# Patient Record
Sex: FEMALE | Employment: OTHER | URBAN - METROPOLITAN AREA
[De-identification: names, ages, dates, MRNs, and addresses within clinical notes are randomized per-mention and may not be internally consistent; named-entity substitution may affect disease eponyms.]

---

## 2018-03-27 PROCEDURE — 88175 CYTOPATH C/V AUTO FLUID REDO: CPT | Performed by: INTERNAL MEDICINE

## 2019-06-10 PROCEDURE — 88173 CYTOPATH EVAL FNA REPORT: CPT | Performed by: OTOLARYNGOLOGY

## 2021-03-18 ENCOUNTER — HOSPITAL ENCOUNTER (OUTPATIENT)
Age: 66
Discharge: HOME | End: 2021-03-18
Payer: MEDICARE

## 2021-03-18 ENCOUNTER — APPOINTMENT (OUTPATIENT)
Dept: GENERAL RADIOLOGY | Age: 66
End: 2021-03-18
Attending: NURSE PRACTITIONER
Payer: MEDICARE

## 2021-03-18 VITALS
OXYGEN SATURATION: 97 % | RESPIRATION RATE: 16 BRPM | DIASTOLIC BLOOD PRESSURE: 86 MMHG | SYSTOLIC BLOOD PRESSURE: 147 MMHG | TEMPERATURE: 99 F | HEART RATE: 93 BPM

## 2021-03-18 DIAGNOSIS — J18.9 COMMUNITY ACQUIRED PNEUMONIA OF RIGHT LOWER LOBE OF LUNG: ICD-10-CM

## 2021-03-18 DIAGNOSIS — R05.9 COUGH: Primary | ICD-10-CM

## 2021-03-18 PROCEDURE — 71046 X-RAY EXAM CHEST 2 VIEWS: CPT | Performed by: NURSE PRACTITIONER

## 2021-03-18 PROCEDURE — 99202 OFFICE O/P NEW SF 15 MIN: CPT | Performed by: NURSE PRACTITIONER

## 2021-03-18 RX ORDER — CEFDINIR 300 MG/1
300 CAPSULE ORAL 2 TIMES DAILY
Qty: 20 CAPSULE | Refills: 0 | Status: SHIPPED | OUTPATIENT
Start: 2021-03-18 | End: 2021-03-28

## 2021-03-18 RX ORDER — AZITHROMYCIN 250 MG/1
TABLET, FILM COATED ORAL
Qty: 1 PACKAGE | Refills: 0 | Status: SHIPPED | OUTPATIENT
Start: 2021-03-18 | End: 2021-03-23

## 2021-03-18 NOTE — ED PROVIDER NOTES
Patient Seen in: Immediate 234       History   Patient presents with:  Cough/URI    Stated Complaint: sore throat    HPI/Subjective:   64-year-old female presents to the IC with complaints of a cough for last 3 weeks. States slightly productive. Vitals [03/18/21 1057]   /86   Pulse 93   Resp 16   Temp 98.7 °F (37.1 °C)   Temp src Temporal   SpO2 97 %   O2 Device None (Room air)       Current:/86   Pulse 93   Temp 98.7 °F (37.1 °C) (Temporal)   Resp 16   LMP 05/07/2004   SpO2 97% that they go back to the old primary for further evaluation. I discussed case with Dr. Mitchell Keep she will gladly accept the patient at this time. Patient is to make a follow-up appointment for further evaluation of the x-ray findings.        The findings on

## 2021-03-19 ENCOUNTER — OFFICE VISIT (OUTPATIENT)
Dept: FAMILY MEDICINE CLINIC | Facility: CLINIC | Age: 66
End: 2021-03-19
Payer: MEDICARE

## 2021-03-19 VITALS
WEIGHT: 202 LBS | BODY MASS INDEX: 38.14 KG/M2 | HEART RATE: 84 BPM | TEMPERATURE: 98 F | RESPIRATION RATE: 17 BRPM | SYSTOLIC BLOOD PRESSURE: 114 MMHG | OXYGEN SATURATION: 98 % | DIASTOLIC BLOOD PRESSURE: 82 MMHG | HEIGHT: 61 IN

## 2021-03-19 DIAGNOSIS — R05.9 COUGH: ICD-10-CM

## 2021-03-19 DIAGNOSIS — R93.89 ABNORMAL CHEST X-RAY: Primary | ICD-10-CM

## 2021-03-19 PROBLEM — E04.1 THYROID NODULE: Status: ACTIVE | Noted: 2021-03-19

## 2021-03-19 PROBLEM — E78.5 HYPERLIPIDEMIA: Status: ACTIVE | Noted: 2021-03-19

## 2021-03-19 PROCEDURE — 99204 OFFICE O/P NEW MOD 45 MIN: CPT | Performed by: FAMILY MEDICINE

## 2021-03-19 RX ORDER — ASPIRIN 81 MG/1
81 TABLET ORAL DAILY
COMMUNITY

## 2021-03-19 RX ORDER — BENZONATATE 100 MG/1
CAPSULE ORAL
COMMUNITY
Start: 2020-11-21 | End: 2021-08-26 | Stop reason: ALTCHOICE

## 2021-03-19 RX ORDER — TRIAMTERENE AND HYDROCHLOROTHIAZIDE 37.5; 25 MG/1; MG/1
CAPSULE ORAL
COMMUNITY
Start: 2021-02-13 | End: 2021-03-19

## 2021-03-19 NOTE — PROGRESS NOTES
[facility] Family Medicine Office Note  Chief Complaint:   Patient presents with:  Establish Care: per pt      HPI:   This is a 74year old female coming in for  Right after she received her second COVID shot on 2/20/2021 developed a dry cough, s Allergies:  No Known Allergies  Current Meds:  Current Outpatient Medications   Medication Sig Dispense Refill   • aspirin 81 MG Oral Tab EC Take 81 mg by mouth daily.      • benzonatate 100 MG Oral Cap Take one cap three times daily for cough     • cef OP - wnl, moist, Nares normal  NECK: FROM, supple  LUNGS: CTAB, no RRW, no evidence of use of any accessory muscle use and normal respiratory effort noted at this time.    CV: RRR  ABD: not distended  NEURO: Alert and oriented to person place and time  GAIT

## 2021-03-22 ENCOUNTER — HOSPITAL ENCOUNTER (OUTPATIENT)
Dept: CT IMAGING | Age: 66
Discharge: HOME | End: 2021-03-22
Attending: FAMILY MEDICINE
Payer: MEDICARE

## 2021-03-22 DIAGNOSIS — R05.9 COUGH: ICD-10-CM

## 2021-03-22 DIAGNOSIS — R93.89 ABNORMAL CHEST X-RAY: ICD-10-CM

## 2021-03-22 LAB — CREAT BLD-MCNC: 0.9 MG/DL

## 2021-03-22 PROCEDURE — 82565 ASSAY OF CREATININE: CPT

## 2021-03-22 PROCEDURE — 71260 CT THORAX DX C+: CPT | Performed by: FAMILY MEDICINE

## 2021-03-24 ENCOUNTER — TELEPHONE (OUTPATIENT)
Dept: FAMILY MEDICINE CLINIC | Facility: CLINIC | Age: 66
End: 2021-03-24

## 2021-03-24 DIAGNOSIS — R91.8 LUNG MASS: Primary | ICD-10-CM

## 2021-04-01 ENCOUNTER — HOSPITAL ENCOUNTER (OUTPATIENT)
Dept: NUCLEAR MEDICINE | Facility: HOSPITAL | Age: 66
Discharge: HOME | End: 2021-04-01
Attending: FAMILY MEDICINE
Payer: MEDICARE

## 2021-04-01 DIAGNOSIS — R91.8 LUNG MASS: ICD-10-CM

## 2021-04-01 PROCEDURE — 78815 PET IMAGE W/CT SKULL-THIGH: CPT | Performed by: FAMILY MEDICINE

## 2021-04-01 PROCEDURE — 82962 GLUCOSE BLOOD TEST: CPT

## 2021-04-02 ENCOUNTER — TELEPHONE (OUTPATIENT)
Dept: FAMILY MEDICINE CLINIC | Facility: CLINIC | Age: 66
End: 2021-04-02

## 2021-04-02 NOTE — TELEPHONE ENCOUNTER
Patient is returning a call from Dr Geraldo Marrero from yesterday.  Patient is going into the [facility] right now and does not want to miss the Drs call, so have Dr Geraldo Marrero call her at/around 11:00 am.

## 2021-04-02 NOTE — TELEPHONE ENCOUNTER
----- Message from Shekhar Aguilar MD sent at 4/2/2021 10:22 AM CDT -----  Called patient and informed her of these results. Follow up recommended in 2-3 weeks. If cough gets worse to come in sooner.  Meanwhile advised to keep appointment to see

## 2021-04-08 ENCOUNTER — TELEPHONE (OUTPATIENT)
Dept: FAMILY MEDICINE CLINIC | Facility: CLINIC | Age: 66
End: 2021-04-08

## 2021-04-08 NOTE — TELEPHONE ENCOUNTER
Angie Allred from [address] [place] [address] called, Pt states she is suppose to get another CT of chest. No order in system.   Please place order and call Angie Allred at [phone]

## 2021-04-08 NOTE — TELEPHONE ENCOUNTER
Yes we should order a CT Chest with contrast on July 1, 2021 to document resolution of this mass / pneumonia. Please place reminder in patient chart.

## 2021-04-08 NOTE — TELEPHONE ENCOUNTER
Recall placed for repeat CT chest    Notified central scheduling - Huongkaren Sandifer at [phone]  CT chest is not due until July- will wait to put in order closer to then. Verbalized understanding.

## 2021-04-08 NOTE — TELEPHONE ENCOUNTER
Pt had CT chest 3/22/2021 (INDICATIONS:  R05 Cough R93.89 Abnormal chest x-ray)    Had PET/CT STANDARD BODY SCAN (ONCOLOGY) on 4/1/2021 (INDICATIONS:  R91.8 Lung mass)    Impression stated \"Consider CT imaging in 3 months to document resolution\"    Next

## 2021-05-14 ENCOUNTER — TELEPHONE (OUTPATIENT)
Dept: FAMILY MEDICINE CLINIC | Facility: CLINIC | Age: 66
End: 2021-05-14

## 2021-05-14 NOTE — TELEPHONE ENCOUNTER
Pt called, needs another note saying that pt needs to stay on unemployment. Please call pt at [phone]. Pt states the doctor who wrote the first letter has since retired. Dr. Uday Fagan Postin.

## 2021-05-17 NOTE — TELEPHONE ENCOUNTER
I have seen her only once in the office for a \"concerned\" visit regarding her lungs, mass noted in her lungs - infectious vs. Mass, and per her improvement it was likely infectious origin, she will need repeat imaging in the next 2 weeks.  I will need to

## 2021-05-17 NOTE — TELEPHONE ENCOUNTER
Left message to voicemail, per verbal release form consent, confirmed with identifying message. Patient advised to call office back [phone] to discuss 's note below.       Medicare Wellness Visit DUE  08/28/2021 08/28/2020, 08/28/2020

## 2021-05-20 NOTE — TELEPHONE ENCOUNTER
Left message to voicemail (per verbal release form consent, confirmed with identifying message.) Patient advised to call office back [phone] to make follow-up appt w/ Dr. Luz Alvarez to discuss note pt is requesting.

## 2021-06-18 DIAGNOSIS — R91.8 ABNORMAL CT SCAN, LUNG: Primary | ICD-10-CM

## 2021-07-15 ENCOUNTER — HOSPITAL ENCOUNTER (OUTPATIENT)
Dept: CT IMAGING | Age: 66
Discharge: HOME | End: 2021-07-15
Attending: FAMILY MEDICINE
Payer: MEDICARE

## 2021-07-15 DIAGNOSIS — R91.8 ABNORMAL CT SCAN, LUNG: ICD-10-CM

## 2021-07-15 LAB — CREAT BLD-MCNC: 0.9 MG/DL

## 2021-07-15 PROCEDURE — 82565 ASSAY OF CREATININE: CPT

## 2021-07-15 PROCEDURE — 71260 CT THORAX DX C+: CPT | Performed by: FAMILY MEDICINE

## 2021-07-19 ENCOUNTER — TELEPHONE (OUTPATIENT)
Dept: FAMILY MEDICINE CLINIC | Facility: CLINIC | Age: 66
End: 2021-07-19

## 2021-07-19 NOTE — TELEPHONE ENCOUNTER
----- Message from Shekhar Donnelly MD sent at 7/19/2021 10:18 AM CDT -----  Called patient to discuss results, \"lung mass\" \has now resolved. Pneumonia resolved.  Some incidental findings of a small left adrenal nodule, and  colonic diverticulo

## 2021-07-20 ENCOUNTER — PATIENT MESSAGE (OUTPATIENT)
Dept: FAMILY MEDICINE CLINIC | Facility: CLINIC | Age: 66
End: 2021-07-20

## 2021-07-20 RX ORDER — ROSUVASTATIN CALCIUM 5 MG/1
TABLET, COATED ORAL
Qty: 30 TABLET | Refills: 0 | Status: SHIPPED | OUTPATIENT
Start: 2021-07-20 | End: 2021-08-30

## 2021-07-20 RX ORDER — TRIAMTERENE AND HYDROCHLOROTHIAZIDE 37.5; 25 MG/1; MG/1
1 TABLET ORAL DAILY PRN
Qty: 30 TABLET | Refills: 0 | Status: SHIPPED | OUTPATIENT
Start: 2021-07-20 | End: 2021-08-30

## 2021-07-20 RX ORDER — TRIAMTERENE AND HYDROCHLOROTHIAZIDE 37.5; 25 MG/1; MG/1
1 TABLET ORAL DAILY PRN
Qty: 30 TABLET | Refills: 0 | Status: SHIPPED | OUTPATIENT
Start: 2021-07-20 | End: 2021-07-20

## 2021-07-20 NOTE — TELEPHONE ENCOUNTER
PT CALLED AND ADV THAT SHE HAS SET UP MEDICARE WELLNESS EXAM FOR 8/26/21    PT DOES NEED REFILL OF     Triamterene-HCTZ 37.5-25 MG Oral Tab    AND    Rosuvastatin Calcium 5 MG Oral Tab    PT ADV NEED TO SEND TO Jazmin Perez.     TRIAMTERENE WAS SENT

## 2021-07-20 NOTE — TELEPHONE ENCOUNTER
From: Raj Lincoln  To: Shekhar Elmore MD  Sent: 7/20/2021 1:11 PM CDT  Subject: Prescription Question    can i get refills     Show detailsHide details  Prescription Details  [facility]  Approved byTrice Lagos

## 2021-07-20 NOTE — TELEPHONE ENCOUNTER
LOV: 3/19/21   Last Refill:   1/20/21 #30 0 RF  Different provider    Sorry for the confusion- I didn't even see the medication in the note at first.    Medications have been pended for review    BP Readings from Last 2 Encounters:  03/19/21 : 114/82  03/1

## 2021-08-26 ENCOUNTER — OFFICE VISIT (OUTPATIENT)
Dept: FAMILY MEDICINE CLINIC | Facility: CLINIC | Age: 66
End: 2021-08-26
Payer: MEDICARE

## 2021-08-26 VITALS
DIASTOLIC BLOOD PRESSURE: 72 MMHG | OXYGEN SATURATION: 97 % | HEART RATE: 68 BPM | RESPIRATION RATE: 16 BRPM | HEIGHT: 62.25 IN | BODY MASS INDEX: 39.63 KG/M2 | SYSTOLIC BLOOD PRESSURE: 102 MMHG | TEMPERATURE: 98 F | WEIGHT: 218.13 LBS

## 2021-08-26 DIAGNOSIS — Z12.31 VISIT FOR SCREENING MAMMOGRAM: ICD-10-CM

## 2021-08-26 DIAGNOSIS — R60.0 PEDAL EDEMA: ICD-10-CM

## 2021-08-26 DIAGNOSIS — R06.00 DYSPNEA ON EXERTION: ICD-10-CM

## 2021-08-26 DIAGNOSIS — R63.5 WEIGHT GAIN: ICD-10-CM

## 2021-08-26 DIAGNOSIS — Z00.00 ENCOUNTER FOR ANNUAL HEALTH EXAMINATION: ICD-10-CM

## 2021-08-26 DIAGNOSIS — Z23 NEED FOR VACCINATION: ICD-10-CM

## 2021-08-26 DIAGNOSIS — E04.1 THYROID NODULE: ICD-10-CM

## 2021-08-26 DIAGNOSIS — Z12.11 ENCOUNTER FOR SCREENING COLONOSCOPY: ICD-10-CM

## 2021-08-26 DIAGNOSIS — Z13.0 SCREENING FOR DEFICIENCY ANEMIA: Primary | ICD-10-CM

## 2021-08-26 DIAGNOSIS — E78.5 HYPERLIPIDEMIA, UNSPECIFIED HYPERLIPIDEMIA TYPE: ICD-10-CM

## 2021-08-26 DIAGNOSIS — E27.8 ADRENAL INCIDENTALOMA (HCC): ICD-10-CM

## 2021-08-26 DIAGNOSIS — R61 EXCESSIVE SWEATING: ICD-10-CM

## 2021-08-26 PROBLEM — K57.90 DIVERTICULOSIS: Status: ACTIVE | Noted: 2021-08-26

## 2021-08-26 PROBLEM — Z87.01 HISTORY OF PNEUMONIA: Status: ACTIVE | Noted: 2021-08-26

## 2021-08-26 PROBLEM — E27.9 ADRENAL NODULE (HCC): Status: RESOLVED | Noted: 2021-08-26 | Resolved: 2021-08-26

## 2021-08-26 PROBLEM — H91.93 BILATERAL HEARING LOSS: Status: ACTIVE | Noted: 2021-08-26

## 2021-08-26 PROBLEM — E27.9 ADRENAL NODULE (HCC): Status: ACTIVE | Noted: 2021-08-26

## 2021-08-26 LAB
ALBUMIN SERPL-MCNC: 3.9 G/DL (ref 3.4–5)
ALBUMIN/GLOB SERPL: 1.2 {RATIO} (ref 1–2)
ALP LIVER SERPL-CCNC: 94 U/L
ALT SERPL-CCNC: 39 U/L
ANION GAP SERPL CALC-SCNC: 2 MMOL/L (ref 0–18)
AST SERPL-CCNC: 25 U/L (ref 15–37)
BASOPHILS # BLD AUTO: 0.03 X10(3) UL (ref 0–0.2)
BASOPHILS NFR BLD AUTO: 0.6 %
BILIRUB SERPL-MCNC: 0.4 MG/DL (ref 0.1–2)
BUN BLD-MCNC: 16 MG/DL (ref 7–18)
CALCIUM BLD-MCNC: 9.2 MG/DL (ref 8.5–10.1)
CHLORIDE SERPL-SCNC: 104 MMOL/L (ref 98–112)
CHOLEST SMN-MCNC: 185 MG/DL (ref ?–200)
CO2 SERPL-SCNC: 31 MMOL/L (ref 21–32)
CREAT BLD-MCNC: 0.74 MG/DL
EOSINOPHIL # BLD AUTO: 0.12 X10(3) UL (ref 0–0.7)
EOSINOPHIL NFR BLD AUTO: 2.5 %
ERYTHROCYTE [DISTWIDTH] IN BLOOD BY AUTOMATED COUNT: 13.1 %
EST. AVERAGE GLUCOSE BLD GHB EST-MCNC: 123 MG/DL (ref 68–126)
GLOBULIN PLAS-MCNC: 3.3 G/DL (ref 2.8–4.4)
GLUCOSE BLD-MCNC: 91 MG/DL (ref 70–99)
HBA1C MFR BLD HPLC: 5.9 % (ref ?–5.7)
HCT VFR BLD AUTO: 46.7 %
HDLC SERPL-MCNC: 67 MG/DL (ref 40–59)
HGB BLD-MCNC: 15.2 G/DL
IMM GRANULOCYTES # BLD AUTO: 0.01 X10(3) UL (ref 0–1)
IMM GRANULOCYTES NFR BLD: 0.2 %
LDLC SERPL CALC-MCNC: 97 MG/DL (ref ?–100)
LYMPHOCYTES # BLD AUTO: 1.35 X10(3) UL (ref 1–4)
LYMPHOCYTES NFR BLD AUTO: 27.7 %
M PROTEIN MFR SERPL ELPH: 7.2 G/DL (ref 6.4–8.2)
MCH RBC QN AUTO: 29.3 PG (ref 26–34)
MCHC RBC AUTO-ENTMCNC: 32.5 G/DL (ref 31–37)
MCV RBC AUTO: 90 FL
MONOCYTES # BLD AUTO: 0.49 X10(3) UL (ref 0.1–1)
MONOCYTES NFR BLD AUTO: 10 %
NEUTROPHILS # BLD AUTO: 2.88 X10 (3) UL (ref 1.5–7.7)
NEUTROPHILS # BLD AUTO: 2.88 X10(3) UL (ref 1.5–7.7)
NEUTROPHILS NFR BLD AUTO: 59 %
NONHDLC SERPL-MCNC: 118 MG/DL (ref ?–130)
OSMOLALITY SERPL CALC.SUM OF ELEC: 285 MOSM/KG (ref 275–295)
PATIENT FASTING Y/N/NP: YES
PATIENT FASTING Y/N/NP: YES
PLATELET # BLD AUTO: 254 10(3)UL (ref 150–450)
POTASSIUM SERPL-SCNC: 4.5 MMOL/L (ref 3.5–5.1)
RBC # BLD AUTO: 5.19 X10(6)UL
SODIUM SERPL-SCNC: 137 MMOL/L (ref 136–145)
TRIGL SERPL-MCNC: 121 MG/DL (ref 30–149)
TSI SER-ACNC: 1.25 MIU/ML (ref 0.36–3.74)
VLDLC SERPL CALC-MCNC: 20 MG/DL (ref 0–30)
WBC # BLD AUTO: 4.9 X10(3) UL (ref 4–11)

## 2021-08-26 PROCEDURE — 85025 COMPLETE CBC W/AUTO DIFF WBC: CPT | Performed by: FAMILY MEDICINE

## 2021-08-26 PROCEDURE — 83036 HEMOGLOBIN GLYCOSYLATED A1C: CPT | Performed by: FAMILY MEDICINE

## 2021-08-26 PROCEDURE — 80053 COMPREHEN METABOLIC PANEL: CPT | Performed by: FAMILY MEDICINE

## 2021-08-26 PROCEDURE — G0439 PPPS, SUBSEQ VISIT: HCPCS | Performed by: FAMILY MEDICINE

## 2021-08-26 PROCEDURE — 84443 ASSAY THYROID STIM HORMONE: CPT | Performed by: FAMILY MEDICINE

## 2021-08-26 PROCEDURE — 80061 LIPID PANEL: CPT | Performed by: FAMILY MEDICINE

## 2021-08-26 NOTE — PATIENT INSTRUCTIONS
Routine health maintenance     Mammogram ordered - schedule this ASAP   Colonoscopy ordered - referral placed to see Dr Ariana Adhikari   All fasting labs ordered today     Adrenal incidentaloma (noted on the CT chest)     Endocrinology - Dr Aaron Fernandez referral makayla Aneurysm (AAA) Covered once in a lifetime for one of the following risk factors   • Men who are 73-68 years old and have ever smoked   • Anyone with a family history -     Colorectal Cancer Screening  Covered for ages 52-80; only need ONE of the following: covered by Medicare Part B; may be covered with your pharmacy  prescription benefits 11/04/2015  No recommendations at this time        Annual Monitoring of Persistent Medications (ACE/ARB, digoxin diuretics, anticonvulsants)    Potassium Annually Lab Resu

## 2021-08-26 NOTE — PROGRESS NOTES
HPI:   Amaury Martin is a 74year old female who presents for a Medicare Subsequent Annual Wellness visit (Pt already had Initial Annual Wellness).     Complaints of weight gain     Wt Readings from Last 6 Encounters:  08/26/21 : 218 lb 2 oz (98.9 k risk of alcohol abuse.         Patient Care Team: Patient Care Team:  Shekhar Parkinson MD as PCP - General (Family Medicine)    Patient Active Problem List:     Hyperlipidemia     Thyroid nodule     History of pneumonia     Bilateral hearing loss not use drugs.      REVIEW OF SYSTEMS:      Negative except as mentioned in the HPI    EXAM:   /72   Pulse 68   Temp 98 °F (36.7 °C) (Temporal)   Resp 16   Ht 5' 2.25\" (1.581 m)   Wt 218 lb 2 oz (98.9 kg)   LMP 05/07/2004   SpO2 97%   BMI 39.58 kg/m² anemia  -     CBC WITH DIFFERENTIAL WITH PLATELET; Future    Encounter for annual health examination    Visit for screening mammogram  -     [facility] MARIELA 2D+3D SCREENING BILAT (CPT=77067/32101);  Future     Need for vaccination  -     PNEUMOCOCCAL IMM ([facility] referral placed for further work up   Excessive unexpected sweating - noting that she has episodes of this even when the temp is very cold     Thyroid nodule     Followed by Dr Marina Wan, ENT - most recent Thyroid US was in 5/2021 and TR 4 and TR 5 (rep Date    CHOLEST 185 08/26/2021    HDL 67 (H) 08/26/2021    LDL 97 08/26/2021    TRIG 121 08/26/2021         Electrocardiogram (EKG)   Covered if needed at Welcome to Medicare, and non-screening if indicated for medical reasons 04/02/2019      Ultrasound Sc Tetanus Toxoid Not covered by Medicare Part B unless medically necessary (cut with metal); may be covered with your pharmacy prescription benefits -    Tetanus, Diptheria and Pertusis TD and TDaP Not covered by Medicare Part B -  No recommendations at t

## 2021-08-30 RX ORDER — ROSUVASTATIN CALCIUM 5 MG/1
TABLET, COATED ORAL
Qty: 30 TABLET | Refills: 0 | Status: SHIPPED | OUTPATIENT
Start: 2021-08-30 | End: 2021-10-04

## 2021-08-30 RX ORDER — TRIAMTERENE AND HYDROCHLOROTHIAZIDE 37.5; 25 MG/1; MG/1
1 TABLET ORAL DAILY PRN
Qty: 30 TABLET | Refills: 0 | Status: SHIPPED | OUTPATIENT
Start: 2021-08-30 | End: 2021-10-04

## 2021-08-30 NOTE — TELEPHONE ENCOUNTER
Hypertension Medications Protocol [id number]/30/2021 06:47 AM   CMP or BMP in past 12 months Protocol Details    Last serum creatinine< 2.0     Appointment in past 6 or next 3 months      Triamterene-HCTZ 37.5-25 MG Oral Tab  Last refill- 7/20/21 #30 with 0

## 2021-09-08 ENCOUNTER — OFFICE VISIT (OUTPATIENT)
Dept: SURGERY | Facility: CLINIC | Age: 66
End: 2021-09-08
Payer: MEDICARE

## 2021-09-08 VITALS
BODY MASS INDEX: 40.12 KG/M2 | WEIGHT: 218 LBS | SYSTOLIC BLOOD PRESSURE: 105 MMHG | HEART RATE: 72 BPM | TEMPERATURE: 97 F | HEIGHT: 62 IN | DIASTOLIC BLOOD PRESSURE: 75 MMHG

## 2021-09-08 DIAGNOSIS — K63.5 POLYP OF COLON, UNSPECIFIED PART OF COLON, UNSPECIFIED TYPE: Primary | ICD-10-CM

## 2021-09-08 PROCEDURE — 99204 OFFICE O/P NEW MOD 45 MIN: CPT | Performed by: SURGERY

## 2021-09-08 RX ORDER — POLYETHYLENE GLYCOL 3350, SODIUM CHLORIDE, SODIUM BICARBONATE, POTASSIUM CHLORIDE 420; 11.2; 5.72; 1.48 G/4L; G/4L; G/4L; G/4L
POWDER, FOR SOLUTION ORAL
Qty: 1 EACH | Refills: 0 | Status: SHIPPED | OUTPATIENT
Start: 2021-09-08 | End: 2021-11-29 | Stop reason: ALTCHOICE

## 2021-09-08 NOTE — H&P
New Patient Visit Note       Active Problems      No diagnosis found.     Chief Complaint   Patient presents with:  Colonoscopy: NP cscope consult- PT states last cscope was 10 years ago denies rectal pain or bleeding states has normal Bms denies family hx Relation Age of Onset   • Cancer Father         prostate ca   • Hypertension Mother    • Ear Problems Mother    • Other (Other) Brother         ms     Social History    Tobacco Use      Smoking status: Never Smoker      Smokeless tobacco: Never Used    Alc membranes. No intra oral lesions. Trachea midline. Cardiovascular. Regular rate and rhythm. 2+ Pulses bilateral upper and lower extremities. No peripheral edema. Respiratory. Normal chest wall excursion. Breathing non labored. No wheezes.   Marylen Revels

## 2021-09-21 ENCOUNTER — HOSPITAL ENCOUNTER (OUTPATIENT)
Dept: CV DIAGNOSTICS | Facility: HOSPITAL | Age: 66
Discharge: HOME | End: 2021-09-21
Attending: FAMILY MEDICINE
Payer: MEDICARE

## 2021-09-21 DIAGNOSIS — R60.0 PEDAL EDEMA: ICD-10-CM

## 2021-09-21 PROCEDURE — 93306 TTE W/DOPPLER COMPLETE: CPT | Performed by: FAMILY MEDICINE

## 2021-09-23 ENCOUNTER — PATIENT MESSAGE (OUTPATIENT)
Dept: FAMILY MEDICINE CLINIC | Facility: CLINIC | Age: 66
End: 2021-09-23

## 2021-09-23 ENCOUNTER — TELEPHONE (OUTPATIENT)
Dept: FAMILY MEDICINE CLINIC | Facility: CLINIC | Age: 66
End: 2021-09-23

## 2021-09-23 NOTE — TELEPHONE ENCOUNTER
----- Message from Shekhar Hurley MD sent at 9/22/2021  8:38 PM CDT -----  [facility] sent   [facility] shows no concerns at this time. Leg swelling is because of long standing hours.  Keep legs elevated as much as you can. ~ MM

## 2021-09-23 NOTE — TELEPHONE ENCOUNTER
From: Wiley Penaloza  Sent: 9/23/2021 4:25 PM CDT  To: [facility] Clinical Staff  Subject: Test Results    i was wondering if my legs are swollen from my veins? i have been retired for 4 months now   do stand alit anymore

## 2021-09-23 NOTE — TELEPHONE ENCOUNTER
[facility] sent to pt regarding doctor's note below  Routing to nurse pool for follow-up message read by pt

## 2021-09-24 NOTE — TELEPHONE ENCOUNTER
Left message to voicemail (per verbal release form consent, confirmed with identifying message.) Patient advised to call office back [phone] - need to clarify [facility].

## 2021-09-28 RX ORDER — POLYETHYLENE GLYCOL 3350, SODIUM CHLORIDE, SODIUM BICARBONATE, POTASSIUM CHLORIDE 420; 11.2; 5.72; 1.48 G/4L; G/4L; G/4L; G/4L
POWDER, FOR SOLUTION ORAL
Qty: 1 EACH | Refills: 0 | OUTPATIENT
Start: 2021-09-28

## 2021-10-05 RX ORDER — TRIAMTERENE AND HYDROCHLOROTHIAZIDE 37.5; 25 MG/1; MG/1
1 TABLET ORAL DAILY PRN
Qty: 30 TABLET | Refills: 0 | Status: SHIPPED | OUTPATIENT
Start: 2021-10-05 | End: 2021-11-09

## 2021-10-05 RX ORDER — ROSUVASTATIN CALCIUM 5 MG/1
TABLET, COATED ORAL
Qty: 30 TABLET | Refills: 0 | Status: SHIPPED | OUTPATIENT
Start: 2021-10-05 | End: 2021-11-09

## 2021-10-05 NOTE — TELEPHONE ENCOUNTER
Both medications last refilled 8/30/21 for #30 with 0 RF  LOV with MM 8/26/21  Last Lipid, CMP & ALT 8/26/21    Hypertension Medications Protocol Passed 10/04/2021 07:24 PM   Protocol Details  CMP or BMP in past 12 months    Last serum creatinine< 2.0    A

## 2021-10-22 ENCOUNTER — LAB REQUISITION (OUTPATIENT)
Dept: LAB | Facility: HOSPITAL | Age: 66
End: 2021-10-22
Payer: MEDICARE

## 2021-10-22 DIAGNOSIS — Z86.010 PERSONAL HISTORY OF COLONIC POLYPS: ICD-10-CM

## 2021-10-22 PROCEDURE — 88305 TISSUE EXAM BY PATHOLOGIST: CPT | Performed by: SURGERY

## 2021-11-04 ENCOUNTER — PATIENT OUTREACH (OUTPATIENT)
Dept: SURGERY | Facility: CLINIC | Age: 66
End: 2021-11-04

## 2021-11-09 ENCOUNTER — PATIENT MESSAGE (OUTPATIENT)
Dept: FAMILY MEDICINE CLINIC | Facility: CLINIC | Age: 66
End: 2021-11-09

## 2021-11-10 RX ORDER — ROSUVASTATIN CALCIUM 5 MG/1
TABLET, COATED ORAL
Qty: 90 TABLET | Refills: 0 | Status: SHIPPED | OUTPATIENT
Start: 2021-11-10

## 2021-11-10 RX ORDER — TRIAMTERENE AND HYDROCHLOROTHIAZIDE 37.5; 25 MG/1; MG/1
1 TABLET ORAL DAILY PRN
Qty: 90 TABLET | Refills: 0 | Status: SHIPPED | OUTPATIENT
Start: 2021-11-10 | End: 2022-02-08

## 2021-11-10 NOTE — TELEPHONE ENCOUNTER
Please refer to patient message encounter 11/09/2021 - pt requesting 90 day supply     LOV 03/19/2021  Last labs 08/26/2021    Last refill on 10/05/2021, for #30 tabs, with 0 refills  Triamterene-HCTZ 37.5-25 MG Oral Tab    Last refill on 10/05/2021, for #

## 2021-11-10 NOTE — TELEPHONE ENCOUNTER
Shekhar Brown MD [place], [place] Nurse 42 minutes ago (1:41 PM)     Is there a way we can make a note in her chart so 90 days are sent when Rx is pended.  Thank you ~ MM

## 2021-11-10 NOTE — TELEPHONE ENCOUNTER
From: Gian Lincoln  To: Shekhar Espinoza MD  Sent: 11/9/2021 5:51 PM CST  Subject: medication refills    can i get the prescription for 3 months at a time ?

## 2021-11-10 NOTE — TELEPHONE ENCOUNTER
Placed \"specialty comments\" under pt's SnapShot  For 90 day refills    Please refer to refill encounter 11/09/2021

## 2021-11-23 ENCOUNTER — PATIENT OUTREACH (OUTPATIENT)
Dept: SURGERY | Facility: CLINIC | Age: 66
End: 2021-11-23

## 2022-02-16 RX ORDER — ROSUVASTATIN CALCIUM 5 MG/1
TABLET, COATED ORAL
Qty: 90 TABLET | Refills: 0 | Status: SHIPPED | OUTPATIENT
Start: 2022-02-16

## 2022-02-16 RX ORDER — TRIAMTERENE AND HYDROCHLOROTHIAZIDE 37.5; 25 MG/1; MG/1
1 TABLET ORAL DAILY PRN
Qty: 90 TABLET | Refills: 0 | Status: SHIPPED | OUTPATIENT
Start: 2022-02-16 | End: 2022-05-17

## 2022-02-16 RX ORDER — ROSUVASTATIN CALCIUM 5 MG/1
TABLET, COATED ORAL
Qty: 90 TABLET | Refills: 0 | OUTPATIENT
Start: 2022-02-16

## 2022-02-16 NOTE — TELEPHONE ENCOUNTER
Triamterene & Rosuvastatin last refilled 11/10/21 for #90 with 0 RF  LOV with MM 8/26/21  No future appt with pcp  Last ALT & Lipid 8/26/21     Cholesterol Medication Protocol Passed 02/16/2022 12:53 PM   Protocol Details  ALT < 80    ALT resulted within past year    Lipid panel within past 12 months    Appointment within past 12 or next 3 months         Hypertension Medications Protocol Passed 02/16/2022 01:12 PM   Protocol Details  CMP or BMP in past 12 months    Last serum creatinine< 2.0    Appointment in past 6 or next 3 months

## 2022-05-06 ENCOUNTER — TELEPHONE (OUTPATIENT)
Dept: FAMILY MEDICINE CLINIC | Facility: CLINIC | Age: 67
End: 2022-05-06

## 2022-05-10 RX ORDER — TRIAMTERENE AND HYDROCHLOROTHIAZIDE 37.5; 25 MG/1; MG/1
1 TABLET ORAL DAILY PRN
Qty: 90 TABLET | Refills: 0 | Status: SHIPPED | OUTPATIENT
Start: 2022-05-10 | End: 2022-08-08

## 2022-05-10 RX ORDER — ROSUVASTATIN CALCIUM 5 MG/1
TABLET, COATED ORAL
Qty: 90 TABLET | Refills: 0 | Status: SHIPPED | OUTPATIENT
Start: 2022-05-10

## 2022-05-10 NOTE — TELEPHONE ENCOUNTER
Hypertension Medications Protocol Failed 05/10/2022 08:36 AM   Protocol Details  Appointment in past 6 or next 3 months    CMP or BMP in past 12 months    Last serum creatinine< 2.0     LOV: 8/26/21   Last Refill:   Triamterene 2/16/22   Rosuvastatin  2/16/22    BP Readings from Last 2 Encounters:  11/29/21 : 120/80  09/08/21 : 105/75    Lab Results   Component Value Date    CHOLEST 185 08/26/2021    TRIG 121 08/26/2021    HDL 67 (H) 08/26/2021    LDL 97 08/26/2021    VLDL 20 08/26/2021    NONHDLC 118 08/26/2021

## 2022-05-16 ENCOUNTER — ORDER TRANSCRIPTION (OUTPATIENT)
Dept: ADMINISTRATIVE | Facility: HOSPITAL | Age: 67
End: 2022-05-16

## 2022-05-17 ENCOUNTER — LAB ENCOUNTER (OUTPATIENT)
Dept: LAB | Facility: HOSPITAL | Age: 67
End: 2022-05-17
Attending: OTOLARYNGOLOGY
Payer: MEDICARE

## 2022-05-17 ENCOUNTER — ORDER TRANSCRIPTION (OUTPATIENT)
Dept: ADMINISTRATIVE | Facility: HOSPITAL | Age: 67
End: 2022-05-17

## 2022-05-17 DIAGNOSIS — E07.9 DISEASE OF THYROID GLAND: Primary | ICD-10-CM

## 2022-05-17 DIAGNOSIS — Z01.818 PRE-PROCEDURAL EXAMINATION: ICD-10-CM

## 2022-05-17 DIAGNOSIS — Z11.59 ENCOUNTER FOR SCREENING FOR OTHER VIRAL DISEASES: ICD-10-CM

## 2022-05-17 DIAGNOSIS — Z01.818 PRE-PROCEDURAL EXAMINATION: Primary | ICD-10-CM

## 2022-05-17 DIAGNOSIS — E04.2 NONTOXIC MULTINODULAR GOITER: ICD-10-CM

## 2022-05-17 LAB
T4 FREE SERPL-MCNC: 1.2 NG/DL (ref 0.8–1.7)
THYROGLOB SERPL-MCNC: 150 U/ML (ref ?–60)
THYROPEROXIDASE AB SERPL-ACNC: 32 U/ML (ref ?–60)
TSI SER-ACNC: 1.86 MIU/ML (ref 0.36–3.74)

## 2022-05-17 PROCEDURE — 86800 THYROGLOBULIN ANTIBODY: CPT

## 2022-05-17 PROCEDURE — 84439 ASSAY OF FREE THYROXINE: CPT

## 2022-05-17 PROCEDURE — 84443 ASSAY THYROID STIM HORMONE: CPT

## 2022-05-17 PROCEDURE — 36415 COLL VENOUS BLD VENIPUNCTURE: CPT

## 2022-05-17 PROCEDURE — 86376 MICROSOMAL ANTIBODY EACH: CPT

## 2022-05-18 LAB — SARS-COV-2 RNA RESP QL NAA+PROBE: NOT DETECTED

## 2022-05-20 ENCOUNTER — HOSPITAL ENCOUNTER (OUTPATIENT)
Dept: ULTRASOUND IMAGING | Facility: HOSPITAL | Age: 67
Discharge: HOME | End: 2022-05-20
Attending: OTOLARYNGOLOGY
Payer: MEDICARE

## 2022-05-20 DIAGNOSIS — E04.2 MULTIPLE THYROID NODULES: ICD-10-CM

## 2022-05-20 DIAGNOSIS — E07.9 THYROID DYSFUNCTION: ICD-10-CM

## 2022-05-20 PROCEDURE — 10005 FNA BX W/US GDN 1ST LES: CPT | Performed by: OTOLARYNGOLOGY

## 2022-05-20 PROCEDURE — 88173 CYTOPATH EVAL FNA REPORT: CPT | Performed by: OTOLARYNGOLOGY

## 2022-07-05 ENCOUNTER — TELEPHONE (OUTPATIENT)
Dept: FAMILY MEDICINE CLINIC | Facility: CLINIC | Age: 67
End: 2022-07-05

## 2022-08-08 ENCOUNTER — TELEPHONE (OUTPATIENT)
Dept: FAMILY MEDICINE CLINIC | Facility: CLINIC | Age: 67
End: 2022-08-08

## 2022-08-29 ENCOUNTER — TELEPHONE (OUTPATIENT)
Dept: FAMILY MEDICINE CLINIC | Facility: CLINIC | Age: 67
End: 2022-08-29

## 2022-08-29 RX ORDER — TRIAMTERENE AND HYDROCHLOROTHIAZIDE 37.5; 25 MG/1; MG/1
1 TABLET ORAL DAILY PRN
Qty: 90 TABLET | Refills: 0 | Status: SHIPPED | OUTPATIENT
Start: 2022-08-29 | End: 2022-11-27

## 2022-08-29 RX ORDER — ROSUVASTATIN CALCIUM 5 MG/1
TABLET, COATED ORAL
Qty: 90 TABLET | Refills: 0 | Status: SHIPPED | OUTPATIENT
Start: 2022-08-29

## 2022-08-29 RX ORDER — ROSUVASTATIN CALCIUM 5 MG/1
TABLET, COATED ORAL
Qty: 90 TABLET | Refills: 0 | OUTPATIENT
Start: 2022-08-29

## 2022-08-29 NOTE — TELEPHONE ENCOUNTER
Last visit 08/30/2021  Last refill 05/10/2022  [employer] message sent to patient to let her know she is due for a follow up.      Cholesterol Medication Protocol Failed 08/28/2022 07:15 PM   Protocol Details  ALT < 80    ALT resulted within past year    Lipid panel within past 12 months    Appointment within past 12 or next 3 months

## 2022-09-02 RX ORDER — ROSUVASTATIN CALCIUM 5 MG/1
TABLET, COATED ORAL
Qty: 90 TABLET | Refills: 0 | OUTPATIENT
Start: 2022-09-02

## 2022-09-02 NOTE — TELEPHONE ENCOUNTER
Future Appointments   Date Time Provider Chang Bolaños   11/21/2022 11:20 AM Phoebe Shankar MD [facility] Ana De Leon

## 2022-11-21 ENCOUNTER — TELEPHONE (OUTPATIENT)
Dept: FAMILY MEDICINE CLINIC | Facility: CLINIC | Age: 67
End: 2022-11-21

## 2022-11-21 ENCOUNTER — OFFICE VISIT (OUTPATIENT)
Dept: FAMILY MEDICINE CLINIC | Facility: CLINIC | Age: 67
End: 2022-11-21
Payer: COMMERCIAL

## 2022-11-21 VITALS
HEIGHT: 62 IN | WEIGHT: 228 LBS | OXYGEN SATURATION: 99 % | SYSTOLIC BLOOD PRESSURE: 134 MMHG | HEART RATE: 82 BPM | BODY MASS INDEX: 41.96 KG/M2 | TEMPERATURE: 97 F | DIASTOLIC BLOOD PRESSURE: 72 MMHG

## 2022-11-21 DIAGNOSIS — E78.2 MIXED HYPERLIPIDEMIA: ICD-10-CM

## 2022-11-21 DIAGNOSIS — Z12.31 ENCOUNTER FOR SCREENING MAMMOGRAM FOR MALIGNANT NEOPLASM OF BREAST: ICD-10-CM

## 2022-11-21 DIAGNOSIS — Z00.00 ANNUAL PHYSICAL EXAM: Primary | ICD-10-CM

## 2022-11-21 DIAGNOSIS — E27.8 ADRENAL INCIDENTALOMA (HCC): ICD-10-CM

## 2022-11-21 DIAGNOSIS — E27.8 ADRENAL NODULE (HCC): ICD-10-CM

## 2022-11-21 DIAGNOSIS — E66.01 OBESITY, MORBID (HCC): ICD-10-CM

## 2022-11-21 DIAGNOSIS — Z78.0 POSTMENOPAUSAL ESTROGEN DEFICIENCY: ICD-10-CM

## 2022-11-21 DIAGNOSIS — E04.1 THYROID NODULE: ICD-10-CM

## 2022-11-21 DIAGNOSIS — Z78.0 POSTMENOPAUSAL: ICD-10-CM

## 2022-11-21 DIAGNOSIS — R73.03 PREDIABETES: ICD-10-CM

## 2022-11-21 DIAGNOSIS — Z00.00 ENCOUNTER FOR ANNUAL HEALTH EXAMINATION: ICD-10-CM

## 2022-11-21 DIAGNOSIS — Z23 NEED FOR VACCINATION: ICD-10-CM

## 2022-11-21 DIAGNOSIS — R60.0 PEDAL EDEMA: Primary | ICD-10-CM

## 2022-11-21 DIAGNOSIS — R60.0 PEDAL EDEMA: ICD-10-CM

## 2022-11-21 NOTE — TELEPHONE ENCOUNTER
Please place referral for her to see Dr Leatha Lowry for her chronic pedal edema and for echo review.

## 2022-11-23 RX ORDER — ROSUVASTATIN CALCIUM 5 MG/1
TABLET, COATED ORAL
Qty: 90 TABLET | Refills: 0 | Status: CANCELLED | OUTPATIENT
Start: 2022-11-23

## 2022-11-23 RX ORDER — ROSUVASTATIN CALCIUM 5 MG/1
TABLET, COATED ORAL
Qty: 90 TABLET | Refills: 0 | Status: SHIPPED | OUTPATIENT
Start: 2022-11-23

## 2022-11-23 RX ORDER — TRIAMTERENE AND HYDROCHLOROTHIAZIDE 37.5; 25 MG/1; MG/1
1 TABLET ORAL DAILY PRN
Qty: 90 TABLET | Refills: 0 | Status: SHIPPED | OUTPATIENT
Start: 2022-11-23 | End: 2023-02-21

## 2022-11-23 RX ORDER — TRIAMTERENE AND HYDROCHLOROTHIAZIDE 37.5; 25 MG/1; MG/1
1 TABLET ORAL DAILY PRN
Qty: 90 TABLET | Refills: 0 | Status: CANCELLED | OUTPATIENT
Start: 2022-11-23 | End: 2023-02-21

## 2022-11-23 NOTE — TELEPHONE ENCOUNTER
Cholesterol Medication Protocol Failed 11/21/2022 02:12 PM   Protocol Details  ALT < 80    ALT resulted within past year    Lipid panel within past 12 months    Appointment within past 12 or next 3 months      Hypertension Medications Protocol Failed 11/21/2022 02:12 PM   Protocol Details  CMP or BMP in past 12 months    Last serum creatinine< 2.0    Appointment in past 6 or next 3 months      Routing to provider per protocol. rosuvastatin 5 MG Oral Tab  Last refilled on 8/29/22 for #90  with 0 rf. Triamterene-HCTZ 37.5-25 MG Oral Tab  Last refilled on 8/29/22 for #90  with 0 rf. Last labs 8/26/21. Last seen on 11/21/22. No future appointments. Thank you.

## 2022-11-24 LAB
ABSOLUTE BASOPHILS: 17 CELLS/UL (ref 0–200)
ABSOLUTE EOSINOPHILS: 122 CELLS/UL (ref 15–500)
ABSOLUTE LYMPHOCYTES: 1531 CELLS/UL (ref 850–3900)
ABSOLUTE MONOCYTES: 568 CELLS/UL (ref 200–950)
ABSOLUTE NEUTROPHILS: 3561 CELLS/UL (ref 1500–7800)
ALBUMIN/GLOBULIN RATIO: 1.7 (CALC) (ref 1–2.5)
ALBUMIN: 4.2 G/DL (ref 3.6–5.1)
ALKALINE PHOSPHATASE: 98 U/L (ref 37–153)
ALT: 22 U/L (ref 6–29)
AST: 17 U/L (ref 10–35)
BASOPHILS: 0.3 %
BILIRUBIN, TOTAL: 0.4 MG/DL (ref 0.2–1.2)
BUN: 14 MG/DL (ref 7–25)
CALCIUM: 9.5 MG/DL (ref 8.6–10.4)
CARBON DIOXIDE: 31 MMOL/L (ref 20–32)
CHLORIDE: 102 MMOL/L (ref 98–110)
CHOL/HDLC RATIO: 2.6 (CALC)
CHOLESTEROL, TOTAL: 190 MG/DL
CREATININE: 0.66 MG/DL (ref 0.5–1.05)
EGFR: 95 ML/MIN/1.73M2
EOSINOPHILS: 2.1 %
GLOBULIN: 2.5 G/DL (CALC) (ref 1.9–3.7)
GLUCOSE: 93 MG/DL (ref 65–99)
HDL CHOLESTEROL: 72 MG/DL
HEMATOCRIT: 43.5 % (ref 35–45)
HEMOGLOBIN A1C: 5.5 % OF TOTAL HGB
HEMOGLOBIN: 14.6 G/DL (ref 11.7–15.5)
LDL-CHOLESTEROL: 95 MG/DL (CALC)
LYMPHOCYTES: 26.4 %
MCH: 29.6 PG (ref 27–33)
MCHC: 33.6 G/DL (ref 32–36)
MCV: 88.2 FL (ref 80–100)
MONOCYTES: 9.8 %
MPV: 10.4 FL (ref 7.5–12.5)
NEUTROPHILS: 61.4 %
NON-HDL CHOLESTEROL: 118 MG/DL (CALC)
PLATELET COUNT: 257 THOUSAND/UL (ref 140–400)
POTASSIUM: 3.9 MMOL/L (ref 3.5–5.3)
PROTEIN, TOTAL: 6.7 G/DL (ref 6.1–8.1)
RDW: 12.6 % (ref 11–15)
RED BLOOD CELL COUNT: 4.93 MILLION/UL (ref 3.8–5.1)
SODIUM: 141 MMOL/L (ref 135–146)
TRIGLYCERIDES: 131 MG/DL
WHITE BLOOD CELL COUNT: 5.8 THOUSAND/UL (ref 3.8–10.8)

## 2023-01-06 ENCOUNTER — HOSPITAL ENCOUNTER (OUTPATIENT)
Dept: BONE DENSITY | Age: 68
Discharge: HOME | End: 2023-01-06
Attending: FAMILY MEDICINE
Payer: MEDICARE

## 2023-01-06 DIAGNOSIS — Z78.0 POSTMENOPAUSAL: ICD-10-CM

## 2023-01-06 DIAGNOSIS — Z78.0 POSTMENOPAUSAL ESTROGEN DEFICIENCY: ICD-10-CM

## 2023-01-06 PROCEDURE — 77080 DXA BONE DENSITY AXIAL: CPT | Performed by: FAMILY MEDICINE

## 2023-01-13 RX ORDER — ROSUVASTATIN CALCIUM 5 MG/1
TABLET, COATED ORAL
Qty: 90 TABLET | Refills: 1 | Status: SHIPPED | OUTPATIENT
Start: 2023-01-13

## 2023-01-13 RX ORDER — TRIAMTERENE AND HYDROCHLOROTHIAZIDE 37.5; 25 MG/1; MG/1
1 TABLET ORAL DAILY PRN
Qty: 90 TABLET | Refills: 1 | Status: SHIPPED | OUTPATIENT
Start: 2023-01-13 | End: 2023-04-13

## 2023-01-13 NOTE — TELEPHONE ENCOUNTER
LOV 11/21/22  Last labs 11/23/22  BP Readings from Last 3 Encounters:  11/21/22 : 134/72  11/29/21 : 120/80  09/08/21 : 105/75    Last refill on 11/23/22, for #90 tabs, with 0 refills  rosuvastatin 5 MG Oral Tab  Cholesterol Medication Protocol Passed 01/13/2023 01:07 PM   Protocol Details  ALT < 80    ALT resulted within past year    Lipid panel within past 12 months    Appointment within past 12 or next 3 months     Last refill on 11/23/22, for #90 tabs, with 0 refills  Triamterene-HCTZ 37.5-25 MG Oral Tab  Hypertension Medications Protocol Passed 01/13/2023 01:07 PM   Protocol Details  CMP or BMP in past 12 months    Last serum creatinine< 2.0    Appointment in past 6 or next 3 months         Future Appointments   Date Time Provider Chang Bolaños   1/18/2023  3:20 PM Corinne Brand, [place], MD [facility] KWASI Garces per protocol

## 2023-01-31 RX ORDER — TRIAMTERENE AND HYDROCHLOROTHIAZIDE 37.5; 25 MG/1; MG/1
1 TABLET ORAL DAILY PRN
Qty: 90 TABLET | Refills: 1 | Status: SHIPPED | OUTPATIENT
Start: 2023-01-31 | End: 2023-05-01

## 2023-01-31 RX ORDER — ROSUVASTATIN CALCIUM 5 MG/1
TABLET, COATED ORAL
Qty: 90 TABLET | Refills: 1 | Status: SHIPPED | OUTPATIENT
Start: 2023-01-31

## 2023-06-05 ENCOUNTER — TELEPHONE (OUTPATIENT)
Dept: FAMILY MEDICINE CLINIC | Facility: CLINIC | Age: 68
End: 2023-06-05

## 2023-06-05 NOTE — TELEPHONE ENCOUNTER
Letter mailed to patient reminding them they have outstanding orders.   Lab Frequency Next Occurrence   [facility] MARIELA 2D+3D SCREENING BILAT (CPT=77067/38245) Once 11/21/2022

## 2023-08-24 ENCOUNTER — PATIENT OUTREACH (OUTPATIENT)
Dept: FAMILY MEDICINE CLINIC | Facility: CLINIC | Age: 68
End: 2023-08-24

## 2023-09-26 NOTE — ED INITIAL ASSESSMENT (HPI)
Cough for 3 weeks. Not getting better with muccinex. No fever, no hx of covid. No shortness of breath. Please reach out to patient and educate on the important role that jardiance could have on her CKD, preventing it from worsening.

## 2024-01-18 ENCOUNTER — OFFICE VISIT (OUTPATIENT)
Dept: FAMILY MEDICINE CLINIC | Facility: CLINIC | Age: 69
End: 2024-01-18
Payer: COMMERCIAL

## 2024-01-18 VITALS
SYSTOLIC BLOOD PRESSURE: 120 MMHG | TEMPERATURE: 97 F | DIASTOLIC BLOOD PRESSURE: 80 MMHG | OXYGEN SATURATION: 98 % | HEIGHT: 62 IN | RESPIRATION RATE: 18 BRPM | WEIGHT: 225 LBS | HEART RATE: 82 BPM | BODY MASS INDEX: 41.41 KG/M2

## 2024-01-18 DIAGNOSIS — E66.01 CLASS 3 SEVERE OBESITY WITHOUT SERIOUS COMORBIDITY WITH BODY MASS INDEX (BMI) OF 40.0 TO 44.9 IN ADULT, UNSPECIFIED OBESITY TYPE (HCC): ICD-10-CM

## 2024-01-18 DIAGNOSIS — Z12.11 COLON CANCER SCREENING: ICD-10-CM

## 2024-01-18 DIAGNOSIS — E78.2 MIXED HYPERLIPIDEMIA: ICD-10-CM

## 2024-01-18 DIAGNOSIS — R73.03 PREDIABETES: ICD-10-CM

## 2024-01-18 DIAGNOSIS — Z00.00 ANNUAL PHYSICAL EXAM: Primary | ICD-10-CM

## 2024-01-18 DIAGNOSIS — Z12.31 ENCOUNTER FOR SCREENING MAMMOGRAM FOR MALIGNANT NEOPLASM OF BREAST: ICD-10-CM

## 2024-01-18 DIAGNOSIS — M79.89 LEG SWELLING: ICD-10-CM

## 2024-01-18 RX ORDER — TOPIRAMATE 25 MG/1
25 TABLET ORAL 2 TIMES DAILY
Qty: 180 TABLET | Refills: 0 | Status: SHIPPED | OUTPATIENT
Start: 2024-01-18 | End: 2024-02-19

## 2024-01-18 NOTE — PROGRESS NOTES
Subjective:   Kendal Lincoln is a 69 year old female who presents for a Medicare Subsequent Annual Wellness visit (Pt already had Initial Annual Wellness) and stable chronic illnesses (not addressed in visit).   Seeing ent and she will follow up for with him.  Colonoscopy. Dr. Davis.   Hyperlipidemia on med no se  She is triamterine for swelling. She was seen by cardiologist and EKG and echo done was normal.   She does eat healthy  Exercise minimal.   Sleep very well.           History/Other:   Fall Risk Assessment:   She has been screened for Falls and is low risk.      Cognitive Assessment:   She had a completely normal cognitive assessment - see flowsheet entries       Functional Ability/Status:   Kendal Lincoln has some abnormal functions as listed below:  She has Hearing problems based on screening of functional status.She has Vision problems based on screening of functional status.       Depression Screening (PHQ-2/PHQ-9): PHQ-2 SCORE: 0  , done 1/15/2024            Advanced Directives:   She does NOT have a Living Will. [Do you have a living will?: Yes]    She does NOT have a Power of  for Health Care. [Do you have a healthcare power of ?: Yes]    Discussed Advance Care Planning with patient (and family/surrogate if present). Standard forms made available to patient in After Visit Summary.      Patient Active Problem List   Diagnosis    Hyperlipidemia    Thyroid nodule    History of pneumonia    Bilateral hearing loss    Diverticulosis    Adrenal incidentaloma (HCC)    Obesity, morbid (HCC)     Allergies:  She has No Known Allergies.    Current Medications:  Outpatient Medications Marked as Taking for the 1/18/24 encounter (Office Visit) with Ines Mckeon MD   Medication Sig    [DISCONTINUED] topiramate 25 MG Oral Tab Take 1 tablet (25 mg total) by mouth 2 (two) times daily.       Medical History:  She  has a past medical history of Osteoarthritis, knee.  Surgical History:  She   has a past surgical history that includes laparoscopic cholecystectomy (2010); tonsillectomy (abt 1961); colonoscopy,biopsy (11/16/2012); colonoscopy (2012); removal gallbladder; other surgical history; and knee replacement surgery (10-14).   Family History:  Her family history includes Cancer in her father; Ear Problems in her mother; Hypertension in her mother; Other in her brother.  Social History:  She  reports that she has never smoked. She has never used smokeless tobacco. She reports current alcohol use. She reports that she does not use drugs.    Tobacco:  She has never smoked tobacco.    CAGE Alcohol Screen:   CAGE screening score of 0 on 1/17/2024, showing low risk of alcohol abuse.      Patient Care Team:  Ines Mckeon MD as PCP - General (Family Medicine)    Review of Systems  Neg fever chills chest pain sob nausea vomiting blood in stool vaginla bleeding.     Objective:   Physical Exam  Cardiovascular:      Rate and Rhythm: Normal rate and regular rhythm.      Pulses: Normal pulses.      Heart sounds: Normal heart sounds.   Pulmonary:      Breath sounds: Normal breath sounds.   Abdominal:      Palpations: Abdomen is soft.   Neurological:      General: No focal deficit present.      Mental Status: She is alert.           /80   Pulse 82   Temp 97.1 °F (36.2 °C) (Temporal)   Resp 18   Ht 5' 2\" (1.575 m)   Wt 225 lb (102.1 kg)   LMP 05/07/2004   SpO2 98%   BMI 41.15 kg/m²  Estimated body mass index is 41.15 kg/m² as calculated from the following:    Height as of this encounter: 5' 2\" (1.575 m).    Weight as of this encounter: 225 lb (102.1 kg).    Medicare Hearing Assessment:   Hearing Screening    Screening Method: Finger Rub  Finger Rub Result: Pass                     Assessment & Plan:   Kendal Lincoln is a 69 year old female who presents for a Medicare Assessment.     1. Annual physical exam (Primary)  -     CBC, Platelet; No Differential  -     Comp Metabolic Panel (14)  2.  Mixed hyperlipidemia  -     Lipid Panel  3. Colon cancer screening  -     Surgery Referral - [facility] ([place])  4. Encounter for screening mammogram for malignant neoplasm of breast  -     [facility] MARIELA 2D+3D SCREENING BILAT (CPT=77067/71631); Future; Expected date: 10/01/2024  5. Leg swelling  -     Cardio Referral - Internal  6. Prediabetes  -     Hemoglobin A1C  7. Class 3 severe obesity without serious comorbidity with body mass index (BMI) of 40.0 to 44.9 in adult, unspecified obesity type (HCC)  -     Discontinue: Topiramate; Take 1 tablet (25 mg total) by mouth 2 (two) times daily.  Dispense: 180 tablet; Refill: 0  Other orders  -     Hemoglobin A1C    The patient indicates understanding of these issues and agrees to the plan.        Return in about 6 months (around 7/18/2024) for DR. CABRERA.     Ines Cabrera MD, 1/18/2024     Supplementary Documentation:   General Health:  In the past six months, have you lost more than 10 pounds without trying?: 2 - No  Has your appetite been poor?: No  Type of Diet: Balanced  How does the patient maintain a good energy level?: Other  How would you describe your daily physical activity?: None  How would you describe your current health state?: Fair  How do you maintain positive mental well-being?: Social Interaction;Games;Visiting Friends;Visiting Family  On a scale of 0 to 10, with 0 being no pain and 10 being severe pain, what is your pain level?: 3 - (Mild)  In the past six months, have you experienced urine leakage?: 1-Yes  At any time do you feel concerned for the safety/well-being of yourself and/or your children, in your home or elsewhere?: No  Have you had any immunizations at another office such as Influenza, Hepatitis B, Tetanus, or Pneumococcal?: No       Kendal Lincoln's SCREENING SCHEDULE   Tests on this list are recommended by your physician but may not be covered, or covered at this frequency, by your insurer.   Please check with your insurance carrier  before scheduling to verify coverage.   PREVENTATIVE SERVICES FREQUENCY &  COVERAGE DETAILS LAST COMPLETION DATE   Diabetes Screening    Fasting Blood Sugar /  Glucose    One screening every 12 months if never tested or if previously tested but not diagnosed with pre-diabetes   One screening every 6 months if diagnosed with pre-diabetes Lab Results   Component Value Date    GLUCOSE 99 11/04/2015    GLU 92 01/18/2024        Cardiovascular Disease Screening    Lipid Panel  Cholesterol  Lipoprotein (HDL)  Triglycerides Covered every 5 years for all Medicare beneficiaries without apparent signs or symptoms of cardiovascular disease Lab Results   Component Value Date    CHOLEST 262 (H) 01/18/2024    HDL 66 01/18/2024     (H) 01/18/2024    TRIG 123 01/18/2024         Electrocardiogram (EKG)   Covered if needed at Welcome to Medicare, and non-screening if indicated for medical reasons 04/02/2019      Ultrasound Screening for Abdominal Aortic Aneurysm (AAA) Covered once in a lifetime for one of the following risk factors    Men who are 65-75 years old and have ever smoked    Anyone with a family history -     Colorectal Cancer Screening  Covered for ages 50-85; only need ONE of the following:    Colonoscopy   Covered every 10 years    Covered every 2 years if patient is at high risk or previous colonoscopy was abnormal 10/22/2021    Health Maintenance   Topic Date Due    Colorectal Cancer Screening  10/22/2024       Flexible Sigmoidoscopy   Covered every 4 years -    Fecal Occult Blood Test Covered annually -   Bone Density Screening    Bone density screening    Covered every 2 years after age 65 if diagnosed with risk of osteoporosis or estrogen deficiency.    Covered yearly for long-term glucocorticoid medication use (Steroids) Last Dexa Scan:    XR DEXA BONE DENSITOMETRY (CPT=77080) 01/06/2023      No recommendations at this time   Pap and Pelvic    Pap   Covered every 2 years for women at normal risk; Annually if  at high risk -  No recommendations at this time    Chlamydia Annually if high risk -  No recommendations at this time   Screening Mammogram    Mammogram     Recommend annually for all female patients aged 40 and older    One baseline mammogram covered for patients aged 35-39 10/13/2023    Health Maintenance   Topic Date Due    Mammogram  10/13/2024       Immunizations    Influenza Covered once per flu season  Please get every year -  Influenza Vaccine(1) due on 10/01/2023    Pneumococcal Each vaccine (Epuqipn85 & [id number]) covered once after 65 Prevnar 13: 04/04/2018    [id number]: -     No recommendations at this time    Hepatitis B One screening covered for patients with certain risk factors   -  No recommendations at this time    Tetanus Toxoid Not covered by Medicare Part B unless medically necessary (cut with metal); may be covered with your pharmacy prescription benefits -    Tetanus, Diptheria and Pertusis TD and TDaP Not covered by Medicare Part B -  No recommendations at this time    Zoster Not covered by Medicare Part B; may be covered with your pharmacy  prescription benefits 11/04/2015  No recommendations at this time

## 2024-01-19 LAB
ALBUMIN/GLOBULIN RATIO: 1.7 (CALC) (ref 1–2.5)
ALBUMIN: 4.3 G/DL (ref 3.6–5.1)
ALKALINE PHOSPHATASE: 82 U/L (ref 37–153)
ALT: 27 U/L (ref 6–29)
AST: 24 U/L (ref 10–35)
BILIRUBIN, TOTAL: 0.5 MG/DL (ref 0.2–1.2)
BUN: 21 MG/DL (ref 7–25)
CALCIUM: 9.6 MG/DL (ref 8.6–10.4)
CARBON DIOXIDE: 30 MMOL/L (ref 20–32)
CHLORIDE: 99 MMOL/L (ref 98–110)
CHOL/HDLC RATIO: 4 (CALC)
CHOLESTEROL, TOTAL: 262 MG/DL
CREATININE: 0.69 MG/DL (ref 0.6–1)
EGFR: 93 ML/MIN/1.73M2
GLOBULIN: 2.6 G/DL (CALC) (ref 1.9–3.7)
GLUCOSE: 92 MG/DL (ref 65–99)
HDL CHOLESTEROL: 66 MG/DL
HEMATOCRIT: 43.7 % (ref 35–45)
HEMOGLOBIN A1C: 5.5 % OF TOTAL HGB
HEMOGLOBIN: 15 G/DL (ref 11.7–15.5)
LDL-CHOLESTEROL: 170 MG/DL (CALC)
MCH: 30.2 PG (ref 27–33)
MCHC: 34.3 G/DL (ref 32–36)
MCV: 88.1 FL (ref 80–100)
MPV: 10.4 FL (ref 7.5–12.5)
NON-HDL CHOLESTEROL: 196 MG/DL (CALC)
PLATELET COUNT: 261 THOUSAND/UL (ref 140–400)
POTASSIUM: 4 MMOL/L (ref 3.5–5.3)
PROTEIN, TOTAL: 6.9 G/DL (ref 6.1–8.1)
RDW: 12.8 % (ref 11–15)
RED BLOOD CELL COUNT: 4.96 MILLION/UL (ref 3.8–5.1)
SODIUM: 139 MMOL/L (ref 135–146)
TRIGLYCERIDES: 123 MG/DL
WHITE BLOOD CELL COUNT: 5.7 THOUSAND/UL (ref 3.8–10.8)

## 2024-01-20 ENCOUNTER — TELEPHONE (OUTPATIENT)
Dept: FAMILY MEDICINE CLINIC | Facility: CLINIC | Age: 69
End: 2024-01-20

## 2024-01-20 DIAGNOSIS — E78.2 MIXED HYPERLIPIDEMIA: Primary | ICD-10-CM

## 2024-01-20 RX ORDER — ROSUVASTATIN CALCIUM 10 MG/1
10 TABLET, COATED ORAL NIGHTLY
Qty: 90 TABLET | Refills: 0 | Status: SHIPPED | OUTPATIENT
Start: 2024-01-20

## 2024-01-20 NOTE — TELEPHONE ENCOUNTER
----- Message from Ines Mckeon MD sent at 1/20/2024  6:43 AM CST -----  Results reviewed. Please inform patient labs overall looks good but her lipid panel shows her cholesterol elevated. Recommend low fat diet and exercise and also increase rosuvastatin from 5 to 10mg. If she is ok can you sent her new script to rosuvastatin 10mg daily 90 tab no refills. Also repeat lipid in 3months.

## 2024-01-26 ENCOUNTER — PATIENT MESSAGE (OUTPATIENT)
Dept: FAMILY MEDICINE CLINIC | Facility: CLINIC | Age: 69
End: 2024-01-26

## 2024-01-26 NOTE — TELEPHONE ENCOUNTER
From: Kendal Lincoln  To: Ines Mckeon  Sent: 1/26/2024 12:13 PM CST  Subject: medication     the medication that you prescribed is working   thank you. i only been taking it for 6 days and lost a couple of pounds.   i get a little tired after taking it but it goes away after i sit for 15 minutes or so.   i hope to continue with it.

## 2024-03-29 ENCOUNTER — PATIENT MESSAGE (OUTPATIENT)
Dept: FAMILY MEDICINE CLINIC | Facility: CLINIC | Age: 69
End: 2024-03-29

## 2024-03-29 NOTE — TELEPHONE ENCOUNTER
From: Marisol Wright  To: Ines Mckeon  Sent: 3/29/2024 10:56 AM CDT  Subject: blood test     you wanted me to get another blood work done. do you need to send a request to [facility] for that?   thank you   marisol wright

## 2024-04-02 LAB
CHOL/HDLC RATIO: 2.6 (CALC)
CHOLESTEROL, TOTAL: 202 MG/DL
HDL CHOLESTEROL: 78 MG/DL
LDL-CHOLESTEROL: 103 MG/DL (CALC)
NON-HDL CHOLESTEROL: 124 MG/DL (CALC)
TRIGLYCERIDES: 109 MG/DL

## 2024-04-08 ENCOUNTER — PATIENT MESSAGE (OUTPATIENT)
Dept: FAMILY MEDICINE CLINIC | Facility: CLINIC | Age: 69
End: 2024-04-08

## 2024-04-08 NOTE — TELEPHONE ENCOUNTER
LOV 1/18/24 for a px.  It looks like this has been sent for her a few times in the past.    Medication Quantity Refills Start End   amoxicillin 500 MG Oral Cap (Discontinued) 4 capsule 3 10/24/2019 3/18/2021   Sig:   Take 4 capsules by mouth one hour prior to dental procedure.     Route:   (none)       Future Appointments   Date Time Provider Department Center   8/5/2024 10:00 AM Ashley Baig PA-C [facility]      Please advise.

## 2024-04-08 NOTE — TELEPHONE ENCOUNTER
From: Kendal Lincoln  To: Ines Mckeon  Sent: 4/8/2024 4:50 PM CDT  Subject: amoxicillin     i take amoxicillin before each dentist appt   i need to get my prescription refilled    thank you

## 2024-04-09 RX ORDER — AMOXICILLIN 500 MG/1
CAPSULE ORAL
Qty: 4 CAPSULE | Refills: 3 | Status: SHIPPED | OUTPATIENT
Start: 2024-04-09

## 2024-04-17 DIAGNOSIS — E78.2 MIXED HYPERLIPIDEMIA: ICD-10-CM

## 2024-04-17 RX ORDER — ROSUVASTATIN CALCIUM 10 MG/1
10 TABLET, COATED ORAL NIGHTLY
Qty: 90 TABLET | Refills: 0 | Status: SHIPPED | OUTPATIENT
Start: 2024-04-17

## 2024-04-17 NOTE — TELEPHONE ENCOUNTER
Rosuvastatin last refilled 1/20/24  NFA  LOV with  1/18/24  Last ALT & Lipid 1/18/24      Cholesterol Medication Protocol [id number]/17/2024 08:10 AM   Protocol Details ALT < 80    ALT resulted within past year    Lipid panel within past 12 months    In person appointment or virtual visit in the past 12 mos or appointment in next 3 mos

## 2024-04-22 ENCOUNTER — TELEPHONE (OUTPATIENT)
Dept: FAMILY MEDICINE CLINIC | Facility: CLINIC | Age: 69
End: 2024-04-22

## 2024-04-22 NOTE — TELEPHONE ENCOUNTER
Overdue A1c  MCM sent  Future Appointments   Date Time Provider Department Center   8/5/2024 10:00 AM Ashley Baig PA-C [facility]

## 2024-04-30 ENCOUNTER — PATIENT MESSAGE (OUTPATIENT)
Dept: FAMILY MEDICINE CLINIC | Facility: CLINIC | Age: 69
End: 2024-04-30

## 2024-04-30 DIAGNOSIS — E04.1 THYROID NODULE: Primary | ICD-10-CM

## 2024-04-30 DIAGNOSIS — R79.89 ABNORMAL THYROID BLOOD TEST: ICD-10-CM

## 2024-04-30 NOTE — TELEPHONE ENCOUNTER
From: Kendal Lincoln  To: Ines Linda  Sent: 4/30/2024 9:22 AM CDT  Subject: blood work test for thyroid     HYROGLOBULIN ANTIBODIES 2 H  Reference Range: < or = 1 IU/mL  THYROID PEROXIDASE ANTIBODIES 1  Reference Range: < 9 IU/mL  T4, FREE   T4, FREE 1.4  Reference Range: 0.8-1.8 ng/dL  TSH   TSH 1.46  is there something i need to do to get these numbers down HYROGLOBULIN ANTIBODIES 2 H  Reference Range: < or = 1 IU/mL  THYROID PEROXIDASE ANTIBODIES 1  Reference Range: < 9 IU/mL  T4, FREE   T4, FREE 1.4  Reference Range: 0.8-1.8 ng/dL  TSH   TSH 1.46

## 2024-04-30 NOTE — TELEPHONE ENCOUNTER
I do recommend discuss with Dr. Davidson since he ordered it and then we will see what he recommends.

## 2024-05-06 NOTE — TELEPHONE ENCOUNTER
I did place referral  Not sure if she saw her ENT already and what did he recommend. ?  Looking at her tsh and T4 they both look normal. Her thyroglobulin elevated it means she might have hashimoto thyroiditis which is autoimmune disease The immune system creates antibodies that attack thyroid cells as if they were bacteria, viruses or some other foreign body. Since her tsh is normal I do not think she need meds at this time but can see endocrine.

## 2024-07-08 DIAGNOSIS — E78.2 MIXED HYPERLIPIDEMIA: ICD-10-CM

## 2024-07-08 RX ORDER — ROSUVASTATIN CALCIUM 10 MG/1
10 TABLET, COATED ORAL NIGHTLY
Qty: 90 TABLET | Refills: 0 | Status: SHIPPED | OUTPATIENT
Start: 2024-07-08

## 2024-07-08 RX ORDER — TRIAMTERENE AND HYDROCHLOROTHIAZIDE 37.5; 25 MG/1; MG/1
1 CAPSULE ORAL DAILY PRN
COMMUNITY
End: 2024-07-08

## 2024-07-08 RX ORDER — TRIAMTERENE AND HYDROCHLOROTHIAZIDE 37.5; 25 MG/1; MG/1
1 CAPSULE ORAL DAILY
Qty: 90 CAPSULE | Refills: 0 | Status: SHIPPED | OUTPATIENT
Start: 2024-07-08 | End: 2024-10-06

## 2024-07-08 NOTE — TELEPHONE ENCOUNTER
Last Office Visit: 1/18/24  Last Refill: 1/2023 (Dr. Watts has filled since our last fill)  Last Labs: 1/19/24      Prescription sent but please remind patient to schedule with cardiology. She was supposed to see Dr. Watts 8/2023

## 2024-07-08 NOTE — TELEPHONE ENCOUNTER
Cholesterol Medication Protocol Bezufe9007/08/2024 10:48 AM   Protocol Details ALT < 80    ALT resulted within past year    Lipid panel within past 12 months    In person appointment or virtual visit in the past 12 mos or appointment in next 3 mos     Request for  ROSUVASTATIN 10 MG Oral Tab     LOV 1/18/24 with    Last refill 4/17/24 - 90 tablets 0 refill  No future appointments.    Labs 1/18/24

## 2024-07-17 ENCOUNTER — TELEPHONE (OUTPATIENT)
Dept: FAMILY MEDICINE CLINIC | Facility: CLINIC | Age: 69
End: 2024-07-17

## 2024-07-17 NOTE — TELEPHONE ENCOUNTER
Patient is requesting to be seen as soon as possible for  Sore throat and ear pain.  No available appts this week.  Ok to work in, or schedule with Mary in [place]?   Please advise

## 2024-07-17 NOTE — TELEPHONE ENCOUNTER
Message left on patient's cell phone to go to [facility] for evaluation.  Instructed to call back with any questions.

## 2024-09-18 ENCOUNTER — PATIENT MESSAGE (OUTPATIENT)
Dept: FAMILY MEDICINE CLINIC | Facility: CLINIC | Age: 69
End: 2024-09-18

## 2024-09-18 RX ORDER — HYDROCORTISONE 25 MG/G
1 CREAM TOPICAL 2 TIMES DAILY
Qty: 28 G | Refills: 0 | Status: SHIPPED | OUTPATIENT
Start: 2024-09-18 | End: 2024-09-25

## 2024-09-18 NOTE — TELEPHONE ENCOUNTER
From: Kendal Wright  To: Ines Mckeon  Sent: 9/18/2024 9:07 AM CDT  Subject: medication for hemorrhoids    Can you recommend a medication i can use for hemorrhoids?   i have been using preparation H but it doesn’t seem to work. it’s still ichy and swollen.   thank you   lashawn wright

## 2024-10-05 DIAGNOSIS — E78.2 MIXED HYPERLIPIDEMIA: ICD-10-CM

## 2024-10-05 RX ORDER — TRIAMTERENE AND HYDROCHLOROTHIAZIDE 37.5; 25 MG/1; MG/1
1 CAPSULE ORAL DAILY
Qty: 90 CAPSULE | Refills: 0 | Status: SHIPPED | OUTPATIENT
Start: 2024-10-05 | End: 2025-01-03

## 2024-10-05 RX ORDER — ROSUVASTATIN CALCIUM 10 MG/1
10 TABLET, COATED ORAL NIGHTLY
Qty: 90 TABLET | Refills: 0 | Status: SHIPPED | OUTPATIENT
Start: 2024-10-05

## 2024-10-05 NOTE — TELEPHONE ENCOUNTER
LOV: 1/18/24 for annual physical        triamterene-hydroCHLOROthiazide 37.5-25 MG Oral Cap  Take 1 capsule by mouth daily. Dispense: 90 capsule, Refills: 0 ordered        07/08/2024       Hypertension Medications Protocol Hkfuyn68/05/2024 10:07 AM   Protocol Details CMP or BMP in past 12 months    Last BP reading less than 140/90    In person appointment or virtual visit in the past 12 mos or appointment in next 3 mos    EGFRCR or GFRNAA > 50     Future Appointments   Date Time Provider Department Center   10/25/2024  9:20 AM ELIJAH RODRIGUEZ [facility]

## 2024-10-05 NOTE — TELEPHONE ENCOUNTER
LOV: 1/18/24 for annual physical    ROSUVASTATIN 10 MG Oral Tab 90 tablet 0 7/8/2024 --   Sig:   TAKE 1 TABLET(10 MG) BY MOUTH EVERY NIGHT AT BEDTIME         Cholesterol Medication Protocol Ncvoaa34/05/2024 09:21 AM   Protocol Details ALT < 80    ALT resulted within past year    Lipid panel within past 12 months    In person appointment or virtual visit in the past 12 mos or appointment in next 3 mos     Future Appointments   Date Time Provider Department Center   10/25/2024  9:20 AM ELIJAH RODRIGUEZ [facility] [place]

## 2024-10-25 ENCOUNTER — HOSPITAL ENCOUNTER (OUTPATIENT)
Dept: MAMMOGRAPHY | Age: 69
Discharge: HOME | End: 2024-10-25
Attending: FAMILY MEDICINE
Payer: MEDICARE

## 2024-10-25 DIAGNOSIS — Z12.31 ENCOUNTER FOR SCREENING MAMMOGRAM FOR MALIGNANT NEOPLASM OF BREAST: ICD-10-CM

## 2024-10-25 PROCEDURE — 77067 SCR MAMMO BI INCL CAD: CPT | Performed by: FAMILY MEDICINE

## 2024-10-25 PROCEDURE — 77063 BREAST TOMOSYNTHESIS BI: CPT | Performed by: FAMILY MEDICINE

## 2024-12-30 DIAGNOSIS — E78.2 MIXED HYPERLIPIDEMIA: ICD-10-CM

## 2024-12-31 RX ORDER — TRIAMTERENE AND HYDROCHLOROTHIAZIDE 37.5; 25 MG/1; MG/1
1 CAPSULE ORAL DAILY
Qty: 90 CAPSULE | Refills: 0 | Status: SHIPPED | OUTPATIENT
Start: 2024-12-31 | End: 2025-03-31

## 2024-12-31 RX ORDER — ROSUVASTATIN CALCIUM 10 MG/1
10 TABLET, COATED ORAL NIGHTLY
Qty: 90 TABLET | Refills: 0 | Status: SHIPPED | OUTPATIENT
Start: 2024-12-31

## 2024-12-31 NOTE — TELEPHONE ENCOUNTER
Hypertension Medications Protocol Cfswki5612/30/2024 06:31 PM   Protocol Details CMP or BMP in past 12 months    Last BP reading less than 140/90    In person appointment or virtual visit in the past 12 mos or appointment in next 3 mos    EGFRCR or GFRNAA > 50     Request for TRIAMTERENE-HYDROCHLOROTHIAZIDE 37.5-25 MG Oral Cap     LOV 01/18/24 with    Last refill 10/5/24 - 90 capsules 0 refill   Future Appointments   Date Time Provider Department Center   1/28/2025  9:40 AM Ines Mckeon MD [facility] [place]   Labs 1/19/24

## 2024-12-31 NOTE — TELEPHONE ENCOUNTER
/Cholesterol Medication Protocol Lmsufz3412/30/2024 06:31 PM    Protocol Details ALT < 80    ALT resulted within past year    Lipid panel within past 12 months    In person appointment or virtual visit in the past 12 mos or appointment in next 3 mos     Request for  ROSUVASTATIN 10 MG Oral Tab     LOV 1/18/24 with    Last refill 10/5/24 - 90 tablets 0 refill   Future Appointments   Date Time Provider Department Center   1/28/2025  9:40 AM Ines Mckeon MD [facility] [place]   Labs 1/19/24

## 2025-01-28 ENCOUNTER — LAB ENCOUNTER (OUTPATIENT)
Dept: LAB | Age: 70
End: 2025-01-28
Attending: FAMILY MEDICINE
Payer: MEDICARE

## 2025-01-28 ENCOUNTER — OFFICE VISIT (OUTPATIENT)
Dept: FAMILY MEDICINE CLINIC | Facility: CLINIC | Age: 70
End: 2025-01-28
Payer: COMMERCIAL

## 2025-01-28 VITALS
DIASTOLIC BLOOD PRESSURE: 78 MMHG | HEART RATE: 84 BPM | HEIGHT: 62 IN | TEMPERATURE: 97 F | BODY MASS INDEX: 37.36 KG/M2 | WEIGHT: 203 LBS | RESPIRATION RATE: 18 BRPM | OXYGEN SATURATION: 99 % | SYSTOLIC BLOOD PRESSURE: 112 MMHG

## 2025-01-28 DIAGNOSIS — Z12.11 SCREENING FOR COLON CANCER: ICD-10-CM

## 2025-01-28 DIAGNOSIS — K57.90 DIVERTICULOSIS: ICD-10-CM

## 2025-01-28 DIAGNOSIS — Z00.00 ANNUAL PHYSICAL EXAM: Primary | ICD-10-CM

## 2025-01-28 DIAGNOSIS — E66.01 OBESITY, MORBID (HCC): ICD-10-CM

## 2025-01-28 DIAGNOSIS — Z78.0 ASYMPTOMATIC MENOPAUSE: ICD-10-CM

## 2025-01-28 DIAGNOSIS — H91.93 BILATERAL HEARING LOSS, UNSPECIFIED HEARING LOSS TYPE: ICD-10-CM

## 2025-01-28 DIAGNOSIS — E04.1 THYROID NODULE: ICD-10-CM

## 2025-01-28 DIAGNOSIS — E78.2 MIXED HYPERLIPIDEMIA: ICD-10-CM

## 2025-01-28 PROBLEM — Z87.01 HISTORY OF PNEUMONIA: Status: RESOLVED | Noted: 2021-08-26 | Resolved: 2025-01-28

## 2025-01-28 LAB
ALBUMIN SERPL-MCNC: 4.2 G/DL (ref 3.2–4.8)
ALBUMIN/GLOB SERPL: 1.4 {RATIO} (ref 1–2)
ALP LIVER SERPL-CCNC: 78 U/L
ALT SERPL-CCNC: 28 U/L
ANION GAP SERPL CALC-SCNC: 5 MMOL/L (ref 0–18)
AST SERPL-CCNC: 11 U/L (ref ?–34)
BILIRUB SERPL-MCNC: 0.4 MG/DL (ref 0.2–1.1)
BUN BLD-MCNC: 17 MG/DL (ref 9–23)
CALCIUM BLD-MCNC: 9.8 MG/DL (ref 8.7–10.6)
CHLORIDE SERPL-SCNC: 101 MMOL/L (ref 98–112)
CHOLEST SERPL-MCNC: 171 MG/DL (ref ?–200)
CO2 SERPL-SCNC: 32 MMOL/L (ref 21–32)
CREAT BLD-MCNC: 0.74 MG/DL
EGFRCR SERPLBLD CKD-EPI 2021: 86 ML/MIN/1.73M2 (ref 60–?)
ERYTHROCYTE [DISTWIDTH] IN BLOOD BY AUTOMATED COUNT: 12.8 %
EST. AVERAGE GLUCOSE BLD GHB EST-MCNC: 108 MG/DL (ref 68–126)
FASTING PATIENT LIPID ANSWER: YES
FASTING STATUS PATIENT QL REPORTED: YES
GLOBULIN PLAS-MCNC: 3.1 G/DL (ref 2–3.5)
GLUCOSE BLD-MCNC: 88 MG/DL (ref 70–99)
HBA1C MFR BLD: 5.4 % (ref ?–5.7)
HCT VFR BLD AUTO: 43.4 %
HDLC SERPL-MCNC: 61 MG/DL (ref 40–59)
HGB BLD-MCNC: 14.3 G/DL
LDLC SERPL CALC-MCNC: 94 MG/DL (ref ?–100)
MAGNESIUM SERPL-MCNC: 2 MG/DL (ref 1.6–2.6)
MCH RBC QN AUTO: 29.8 PG (ref 26–34)
MCHC RBC AUTO-ENTMCNC: 32.9 G/DL (ref 31–37)
MCV RBC AUTO: 90.4 FL
NONHDLC SERPL-MCNC: 110 MG/DL (ref ?–130)
OSMOLALITY SERPL CALC.SUM OF ELEC: 287 MOSM/KG (ref 275–295)
PLATELET # BLD AUTO: 271 10(3)UL (ref 150–450)
POTASSIUM SERPL-SCNC: 4.2 MMOL/L (ref 3.5–5.1)
PROT SERPL-MCNC: 7.3 G/DL (ref 5.7–8.2)
RBC # BLD AUTO: 4.8 X10(6)UL
SODIUM SERPL-SCNC: 138 MMOL/L (ref 136–145)
TRIGL SERPL-MCNC: 86 MG/DL (ref 30–149)
TSI SER-ACNC: 1.35 UIU/ML (ref 0.55–4.78)
VLDLC SERPL CALC-MCNC: 14 MG/DL (ref 0–30)
WBC # BLD AUTO: 4.6 X10(3) UL (ref 4–11)

## 2025-01-28 PROCEDURE — 36415 COLL VENOUS BLD VENIPUNCTURE: CPT | Performed by: FAMILY MEDICINE

## 2025-01-28 PROCEDURE — 1160F RVW MEDS BY RX/DR IN RCRD: CPT | Performed by: FAMILY MEDICINE

## 2025-01-28 PROCEDURE — 83036 HEMOGLOBIN GLYCOSYLATED A1C: CPT | Performed by: FAMILY MEDICINE

## 2025-01-28 PROCEDURE — 1159F MED LIST DOCD IN RCRD: CPT | Performed by: FAMILY MEDICINE

## 2025-01-28 PROCEDURE — 1125F AMNT PAIN NOTED PAIN PRSNT: CPT | Performed by: FAMILY MEDICINE

## 2025-01-28 PROCEDURE — 84443 ASSAY THYROID STIM HORMONE: CPT | Performed by: FAMILY MEDICINE

## 2025-01-28 PROCEDURE — 80061 LIPID PANEL: CPT | Performed by: FAMILY MEDICINE

## 2025-01-28 PROCEDURE — G0439 PPPS, SUBSEQ VISIT: HCPCS | Performed by: FAMILY MEDICINE

## 2025-01-28 PROCEDURE — 85027 COMPLETE CBC AUTOMATED: CPT | Performed by: FAMILY MEDICINE

## 2025-01-28 PROCEDURE — 80053 COMPREHEN METABOLIC PANEL: CPT | Performed by: FAMILY MEDICINE

## 2025-01-28 PROCEDURE — 3008F BODY MASS INDEX DOCD: CPT | Performed by: FAMILY MEDICINE

## 2025-01-28 PROCEDURE — 3074F SYST BP LT 130 MM HG: CPT | Performed by: FAMILY MEDICINE

## 2025-01-28 PROCEDURE — 1170F FXNL STATUS ASSESSED: CPT | Performed by: FAMILY MEDICINE

## 2025-01-28 PROCEDURE — 96160 PT-FOCUSED HLTH RISK ASSMT: CPT | Performed by: FAMILY MEDICINE

## 2025-01-28 PROCEDURE — 3078F DIAST BP <80 MM HG: CPT | Performed by: FAMILY MEDICINE

## 2025-01-28 PROCEDURE — 83735 ASSAY OF MAGNESIUM: CPT | Performed by: FAMILY MEDICINE

## 2025-01-28 RX ORDER — MUPIROCIN 20 MG/G
1 OINTMENT TOPICAL DAILY
COMMUNITY
Start: 2024-05-02

## 2025-01-28 NOTE — PROGRESS NOTES
Subjective:   Kendal Lincoln is a 70 year old female who presents for a MA AHA (Medicare Advantage Annual Health Assessment) and Subsequent Annual Wellness visit (Pt already had Initial Annual Wellness) and stable chronic illnesses (not addressed in visit).     Seeing Gi Dr. Turpin  colonoscopy in 2024 repeat in 5 yrs.   Mammogram normal discuss dense breast no further testing right now.   Decline flu shot.   Seeing ent.   Seeing weight loss clinic and lost weight.   Discuss low bp today check bp at home.   History/Other:   Fall Risk Assessment:   She has been screened for Falls and is low risk.      Cognitive Assessment:   She had a completely normal cognitive assessment - see flowsheet entries     Functional Ability/Status:   Kendal Lincoln has some abnormal functions as listed below:  She has Hearing problems based on screening of functional status.She has Vision problems based on screening of functional status.       Depression Screening (PHQ):  PHQ-2 SCORE: 0  , done 1/28/2025            Advanced Directives:   She does NOT have a Living Will. [Do you have a living will?: (Patient-Rptd) No]  She does NOT have a Power of  for Health Care. [Do you have a healthcare power of ?: (Patient-Rptd) No]  Discussed Advance Care Planning with patient (and family/surrogate if present). Standard forms made available to patient in After Visit Summary.      Patient Active Problem List   Diagnosis    Hyperlipidemia    Thyroid nodule    Bilateral hearing loss    Diverticulosis    Adrenal incidentaloma (HCC)    Obesity, morbid (HCC)     Allergies:  She has No Known Allergies.    Current Medications:  Outpatient Medications Marked as Taking for the 1/28/25 encounter (Office Visit) with Ines Mckeon MD   Medication Sig    mupirocin 2 % External Ointment Apply 1 Application topically daily.    cholecalciferol 25 MCG (1000 UT) Oral Cap Take 1 capsule (1,000 Units total) by mouth daily.    SEMAGLUTIDE OR  Take by mouth As Directed.    triamterene-hydroCHLOROthiazide 37.5-25 MG Oral Cap Take 1 capsule by mouth daily.    rosuvastatin 10 MG Oral Tab Take 1 tablet (10 mg total) by mouth nightly.       Medical History:  She  has a past medical history of Osteoarthritis, knee.  Surgical History:  She  has a past surgical history that includes laparoscopic cholecystectomy (2010); tonsillectomy (abt 1961); colonoscopy,biopsy (11/16/2012); colonoscopy (2012); removal gallbladder; other surgical history; and knee replacement surgery (10-14).   Family History:  Her family history includes Cancer in her father; Ear Problems in her mother; Hypertension in her mother; Other in her brother.  Social History:  She  reports that she has never smoked. She has never used smokeless tobacco. She reports current alcohol use. She reports that she does not use drugs.    Tobacco:  Discuss with her    CAGE Alcohol Screen:   CAGE screening score of 0 on 1/23/2025, showing low risk of alcohol abuse.      Patient Care Team:  Ines Mckeon MD as PCP - General (Family Medicine)    Review of Systems  Neg cough cold chest pain sob nausea vomiting blood in urine blood in stool.     Objective:   Physical Exam  Constitutional:       General: She is not in acute distress.     Appearance: She is not ill-appearing.   HENT:      Right Ear: Tympanic membrane normal.      Left Ear: Tympanic membrane normal.      Nose: No congestion or rhinorrhea.      Mouth/Throat:      Pharynx: No oropharyngeal exudate or posterior oropharyngeal erythema.   Cardiovascular:      Rate and Rhythm: Normal rate and regular rhythm.      Pulses: Normal pulses.      Heart sounds: Normal heart sounds.   Pulmonary:      Effort: Pulmonary effort is normal.      Breath sounds: Normal breath sounds.   Abdominal:      Palpations: Abdomen is soft.   Musculoskeletal:      Right lower leg: No edema.      Left lower leg: No edema.   Skin:     General: Skin is warm.      Capillary Refill:  Capillary refill takes less than 2 seconds.   Neurological:      General: No focal deficit present.      Mental Status: She is alert.           /78 (BP Location: Left arm, Patient Position: Sitting, Cuff Size: large)   Pulse 84   Temp 97.3 °F (36.3 °C) (Temporal)   Resp 18   Ht 5' 2\" (1.575 m)   Wt 203 lb (92.1 kg)   LMP 05/07/2004   SpO2 99%   BMI 37.13 kg/m²  Estimated body mass index is 37.13 kg/m² as calculated from the following:    Height as of this encounter: 5' 2\" (1.575 m).    Weight as of this encounter: 203 lb (92.1 kg).    Medicare Hearing Assessment:   Hearing Screening    Time taken: 1/28/2025  9:45 AM  Screening Method: Finger Rub  Finger Rub Result: Pass (Comment: left ear fails she is not wearing her aids today.)         Visual Acuity:   Right Eye Visual Acuity: Uncorrected Right Eye Chart Acuity: 20/140 (unable to see due to cataract)   Left Eye Visual Acuity: Uncorrected Left Eye Chart Acuity: 20/30   Both Eyes Visual Acuity: Uncorrected Both Eyes Chart Acuity: 20/30   Able To Tolerate Visual Acuity: Yes        Assessment & Plan:   Kendal Lincoln is a 70 year old female who presents for a Medicare Assessment.     1. Annual physical exam (Primary)  Labs ordered  Colonoscopy utd  -     TSH W Reflex To Free T4  -     Lipid Panel  -     Hemoglobin A1C  2. Screening for colon cancer  utd  3. Asymptomatic menopause  Order placed  -     XR DEXA BONE DENSITOMETRY (CPT=77080); Future; Expected date: 01/28/2025  4. Mixed hyperlipidemia  Labs ordered  -     CBC, Platelet; No Differential  -     Comp Metabolic Panel (14)  -     TSH W Reflex To Free T4  -     Lipid Panel  -     Hemoglobin A1C  -     Magnesium  5. Obesity, morbid (HCC)  Stable.  6. Diverticulosis  Stable seeing gi  7. Thyroid nodule  stable  8. Bilateral hearing loss, unspecified hearing loss type  stable  The patient indicates understanding of these issues and agrees to the plan.  Reinforced healthy diet, lifestyle, and  exercise.      No follow-ups on file.     Ines Mckeon MD, 1/28/2025     Supplementary Documentation:   General Health:  In the past six months, have you lost more than 10 pounds without trying?: (Patient-Rptd) 2 - No  Has your appetite been poor?: (Patient-Rptd) No  Type of Diet: (Patient-Rptd) Balanced  How does the patient maintain a good energy level?: (Patient-Rptd) Other  How would you describe your daily physical activity?: (Patient-Rptd) Light  How would you describe your current health state?: (Patient-Rptd) Fair  How do you maintain positive mental well-being?: (Patient-Rptd) Social Interaction;Games;Visiting Friends;Visiting Family  On a scale of 0 to 10, with 0 being no pain and 10 being severe pain, what is your pain level?: (Patient-Rptd) 2 - (Mild)  In the past six months, have you experienced urine leakage?: (Patient-Rptd) 0-No  At any time do you feel concerned for the safety/well-being of yourself and/or your children, in your home or elsewhere?: (Patient-Rptd) No  Have you had any immunizations at another office such as Influenza, Hepatitis B, Tetanus, or Pneumococcal?: (Patient-Rptd) Yes    Health Maintenance   Topic Date Due    COVID-19 Vaccine (3 - 2024-25 season) 09/01/2024    Influenza Vaccine (1) 10/01/2024    Colorectal Cancer Screening  10/22/2024    Annual Well Visit  01/01/2025    Annual Depression Screening  01/01/2025    Mammogram  10/25/2025    DEXA Scan  Completed    Fall Risk Screening (Annual)  Completed    Pneumococcal Vaccine: 50+ Years  Completed    Zoster Vaccines  Completed    Meningococcal B Vaccine  Aged Out

## 2025-01-30 RX ORDER — AMOXICILLIN 500 MG/1
CAPSULE ORAL
Qty: 4 CAPSULE | Refills: 3 | Status: SHIPPED | OUTPATIENT
Start: 2025-01-30

## 2025-01-30 NOTE — TELEPHONE ENCOUNTER
amoxicillin 500 MG Oral Cap [Ines Mckeon]      Patient Comment: going to dentist  please refill    Last office visit 1/28/25  Last refilled on 4/9/24 for # 4 with 3 refills  No future appointments.     Thank you.

## 2025-01-31 ENCOUNTER — TELEPHONE (OUTPATIENT)
Dept: FAMILY MEDICINE CLINIC | Facility: CLINIC | Age: 70
End: 2025-01-31

## 2025-01-31 NOTE — TELEPHONE ENCOUNTER
----- Message from Judith Bowles sent at 1/31/2025  8:33 AM CST -----  Results reviewed.   Cholesterol improved  No diabetes or prediabetes  Chemistries normal, continue Crestor  Thyroid function normal  Magnesium normal  No anemia

## 2025-02-18 ENCOUNTER — PATIENT MESSAGE (OUTPATIENT)
Dept: FAMILY MEDICINE CLINIC | Facility: CLINIC | Age: 70
End: 2025-02-18

## 2025-02-18 DIAGNOSIS — G25.81 RESTLESS LEG: Primary | ICD-10-CM

## 2025-02-27 ENCOUNTER — TELEPHONE (OUTPATIENT)
Dept: FAMILY MEDICINE CLINIC | Facility: CLINIC | Age: 70
End: 2025-02-27

## 2025-03-27 ENCOUNTER — TELEPHONE (OUTPATIENT)
Dept: FAMILY MEDICINE CLINIC | Facility: CLINIC | Age: 70
End: 2025-03-27

## 2025-04-05 DIAGNOSIS — E78.2 MIXED HYPERLIPIDEMIA: ICD-10-CM

## 2025-04-05 RX ORDER — TRIAMTERENE AND HYDROCHLOROTHIAZIDE 37.5; 25 MG/1; MG/1
1 CAPSULE ORAL DAILY
Qty: 90 CAPSULE | Refills: 0 | Status: SHIPPED | OUTPATIENT
Start: 2025-04-05 | End: 2025-07-04

## 2025-04-05 RX ORDER — ROSUVASTATIN CALCIUM 10 MG/1
10 TABLET, COATED ORAL NIGHTLY
Qty: 90 TABLET | Refills: 0 | Status: SHIPPED | OUTPATIENT
Start: 2025-04-05

## 2025-04-05 NOTE — TELEPHONE ENCOUNTER
Rosuvastatin last refilled 12/31/24  No future appointment in office  LOV with  1/28/25  Last labs 1/28/25    Cholesterol Medication Protocol [id number]/05/2025 03:55 AM   Protocol Details ALT < 80    ALT resulted within past year    Lipid panel within past 12 months    In person appointment or virtual visit in the past 12 mos or appointment in next 3 mos    Medication is active on med list

## 2025-04-05 NOTE — TELEPHONE ENCOUNTER
Triamterene last refilled 12/31/24  No future appointment in office  LOV with  1/28/25  Last labs 1/28/25    BP Readings from Last 3 Encounters:   01/28/25 112/78   01/18/24 120/80   11/21/22 134/72      Hypertension Medications Protocol Alqgth4904/05/2025 03:56 AM   Protocol Details CMP or BMP in past 12 months    Last BP reading less than 140/90    In person appointment or virtual visit in the past 12 mos or appointment in next 3 mos    EGFRCR or GFRNAA > 50    Medication is active on med list

## 2025-05-24 ENCOUNTER — HOSPITAL ENCOUNTER (OUTPATIENT)
Dept: BONE DENSITY | Age: 70
Discharge: HOME | End: 2025-05-24
Attending: FAMILY MEDICINE
Payer: MEDICARE

## 2025-05-24 DIAGNOSIS — Z78.0 ASYMPTOMATIC MENOPAUSE: ICD-10-CM

## 2025-05-24 PROCEDURE — 77080 DXA BONE DENSITY AXIAL: CPT | Performed by: FAMILY MEDICINE

## 2025-06-12 ENCOUNTER — PATIENT MESSAGE (OUTPATIENT)
Dept: FAMILY MEDICINE CLINIC | Facility: CLINIC | Age: 70
End: 2025-06-12

## 2025-07-31 DIAGNOSIS — E78.2 MIXED HYPERLIPIDEMIA: ICD-10-CM

## 2025-07-31 RX ORDER — ROSUVASTATIN CALCIUM 10 MG/1
10 TABLET, COATED ORAL NIGHTLY
Qty: 90 TABLET | Refills: 0 | OUTPATIENT
Start: 2025-07-31

## 2025-07-31 RX ORDER — ROSUVASTATIN CALCIUM 10 MG/1
10 TABLET, COATED ORAL NIGHTLY
Qty: 90 TABLET | Refills: 0 | Status: SHIPPED | OUTPATIENT
Start: 2025-07-31

## 2025-07-31 RX ORDER — TRIAMTERENE AND HYDROCHLOROTHIAZIDE 37.5; 25 MG/1; MG/1
1 CAPSULE ORAL DAILY
Qty: 90 CAPSULE | Refills: 0 | Status: SHIPPED | OUTPATIENT
Start: 2025-07-31 | End: 2025-10-29

## (undated) DIAGNOSIS — E04.2 MULTIPLE THYROID NODULES: ICD-10-CM

## (undated) DIAGNOSIS — E07.9 THYROID DYSFUNCTION: Primary | ICD-10-CM

## (undated) NOTE — Clinical Note
I saw Sonya Brown today. I am scheduling her for colonoscopy, she is due, she has a history of colon polyps. With regard to the adrenal adenoma I am referring her to urology for evaluation and treatment.   I have recommended Dr. Ginger Meadows  And given her cont

## (undated) NOTE — LETTER
03/27/25      Kendal Lincoln  [address]  [facility] [address]          Dear Kendal Lincoln    Our records indicate that you have outstanding lab work and/or testing that was ordered for you and has not yet been completed:    Lab Frequency Next Occurrence   XR DEXA BONE DENSITOMETRY (CPT=77080) Once 01/28/2025     To provide you with the best possible care, please complete these orders at your earliest convenience. If you have recently completed these orders please disregard this letter.  To schedule imaging, labs, or outpatient tests please call Central Scheduling at [phone].    For any blood work needed, you can get this done at the Reference Lab in our [place] office anytime Monday-Friday from 7:30am-4:00pm and you do need an appointment. They are closed for lunch between 12-1pm. They are closed Saturday and Sunday. If you need a time outside of these hours please call us to schedule an appointment.  *If you prefer to use [facility] for your labs please let us know so we can fax your orders.              Thank you,    [facility] [place]

## (undated) NOTE — LETTER
Giulia Luis Enriquejonathon [address] [address]           Dear Jaylin Hong records indicate that you have outstanding lab work and or testing that was ordered for you and has not yet been completed:  Lab Frequency Next Occurrence   MICHAEL MARIELA 2D+3D SCREENING BILAT (CPT=77067/00268) Once 11/21/2022      To provide you with the best possible care, please complete these orders at your earliest convenience. If you have recently completed these orders please disregard this letter. If you have any questions please call the office at [phone].      Thank you,     [facility]